# Patient Record
Sex: FEMALE | Race: WHITE | NOT HISPANIC OR LATINO | Employment: FULL TIME | URBAN - METROPOLITAN AREA
[De-identification: names, ages, dates, MRNs, and addresses within clinical notes are randomized per-mention and may not be internally consistent; named-entity substitution may affect disease eponyms.]

---

## 2022-12-09 ENCOUNTER — OFFICE VISIT (OUTPATIENT)
Dept: OBSTETRICS AND GYNECOLOGY | Facility: CLINIC | Age: 27
End: 2022-12-09

## 2022-12-09 VITALS
WEIGHT: 146.8 LBS | HEIGHT: 62 IN | DIASTOLIC BLOOD PRESSURE: 68 MMHG | SYSTOLIC BLOOD PRESSURE: 122 MMHG | BODY MASS INDEX: 27.02 KG/M2

## 2022-12-09 DIAGNOSIS — Z01.419 WOMEN'S ANNUAL ROUTINE GYNECOLOGICAL EXAMINATION: Primary | ICD-10-CM

## 2022-12-09 DIAGNOSIS — R10.2 PELVIC PAIN: ICD-10-CM

## 2022-12-09 PROCEDURE — 99385 PREV VISIT NEW AGE 18-39: CPT | Performed by: NURSE PRACTITIONER

## 2022-12-09 NOTE — PROGRESS NOTES
"     Gynecologic Annual Exam Note        Gynecologic Exam        Subjective     HPI  Lolly Hummel is a 27 y.o.  female who presents for annual well woman exam as a new patient. There were no changes to her medical or surgical history since her last visit.. Patient reports problems with: none. Patient's last menstrual period was 11/10/2022 (exact date).. Her periods are regular every 25-35 days, lasting 5 days. The flow is moderate to heavy, 1st 2 days and then spotting, with mild cramping. Patient reports when she was younger she fell off a horse, since then she has been having on and off left sided pains during ovulation period to the point it is \"unbearable\". Patient states \"it feels like its swelling up\".     Partner Status: Marital Status: single.  She is sexually active. She has not had new partners.. STD testing recommendations have been explained to the patient and she does not desire STD testing. She is desiring to conceive in the near future.    Additional OB/GYN History   Current contraception: contraceptive methods: coitus interuptis   Desires to: do not start contraception  Thromboembolic Disease: none  Age of menarche: 15    History of STD: no    Last Pap : . Results: negative. HPV: not done  Last Completed Pap Smear     This patient has no relevant Health Maintenance data.           History of abnormal Pap smear: no  Gardasil status:completed  Family history of uterine, colon, breast, or ovarian cancer: no  Performs monthly Self-Breast Exam: no  Exercises Regularly:yes  Feelings of Anxiety or Depression: yes - Anxiety   Tobacco Usage?: No     No current outpatient medications on file.     Patient denies the need for medication refills today.    OB History        0    Para   0    Term   0       0    AB   0    Living   0       SAB   0    IAB   0    Ectopic   0    Molar   0    Multiple   0    Live Births   0                Health Maintenance   Topic Date Due   • Annual " "Gynecologic Pelvic and Breast Exam  Never done   • COVID-19 Vaccine (1) Never done   • HPV VACCINES (1 - 2-dose series) Never done   • TDAP/TD VACCINES (1 - Tdap) Never done   • INFLUENZA VACCINE  Never done   • HEPATITIS C SCREENING  Never done   • PAP SMEAR  Never done   • LIPID PANEL  Never done   • Pneumococcal Vaccine 0-64  Aged Out       Past Medical History:   Diagnosis Date   • Anxiety 2012    Have been mediated previously with ativan, not currently medicated   • Hyperlipidemia 2015    Lifestyle related, has improved since.   • Seizures (HCC) 2013    Reaction to penecillin        Past Surgical History:   Procedure Laterality Date   • TONSILLECTOMY      21 years old       The additional following portions of the patient's history were reviewed and updated as appropriate: allergies, current medications, past family history, past medical history, past social history and past surgical history.    Review of Systems   Constitutional: Negative.    HENT: Negative.    Eyes: Negative.    Respiratory: Negative.    Cardiovascular: Negative.    Gastrointestinal: Negative.    Endocrine: Negative.    Genitourinary: Negative.    Musculoskeletal: Negative.    Skin: Negative.    Allergic/Immunologic: Negative.    Neurological: Negative.    Hematological: Negative.    Psychiatric/Behavioral: Negative.          I have reviewed and agree with the HPI, ROS, and historical information as entered above. Alia Arboleda, APRN        Objective   /68 (BP Location: Right arm)   Ht 157.5 cm (62\")   Wt 66.6 kg (146 lb 12.8 oz)   LMP 11/10/2022 (Exact Date)   Breastfeeding No   BMI 26.85 kg/m²     Physical Exam  Vitals and nursing note reviewed. Exam conducted with a chaperone present.   Constitutional:       Appearance: Normal appearance. She is well-developed and normal weight.   HENT:      Head: Normocephalic and atraumatic.   Neck:      Thyroid: No thyroid mass or thyromegaly.   Cardiovascular:      Rate and Rhythm: " Normal rate and regular rhythm.      Heart sounds: No murmur heard.  Pulmonary:      Effort: Pulmonary effort is normal. No retractions.      Breath sounds: Normal breath sounds. No wheezing, rhonchi or rales.   Chest:      Chest wall: No mass or tenderness.   Breasts:     Right: Normal. No mass, nipple discharge, skin change or tenderness.      Left: Normal. No mass, nipple discharge, skin change or tenderness.   Abdominal:      Palpations: Abdomen is soft. Abdomen is not rigid. There is no mass.      Tenderness: There is no abdominal tenderness. There is no guarding.      Hernia: No hernia is present.   Genitourinary:     General: Normal vulva.      Exam position: Lithotomy position.      Labia:         Right: No rash, tenderness or lesion.         Left: No rash, tenderness or lesion.       Vagina: Normal. No vaginal discharge or lesions.      Cervix: No cervical motion tenderness, discharge, lesion or cervical bleeding.      Uterus: Normal. Not enlarged, not fixed and not tender.       Adnexa: Right adnexa normal and left adnexa normal.        Right: No mass or tenderness.          Left: No mass or tenderness.        Rectum: Normal. No external hemorrhoid.   Musculoskeletal:      Cervical back: Normal range of motion. No muscular tenderness.   Neurological:      Mental Status: She is alert and oriented to person, place, and time.   Psychiatric:         Behavior: Behavior normal.            Assessment and Plan    Problem List Items Addressed This Visit    None  Visit Diagnoses     Women's annual routine gynecological examination    -  Primary    Relevant Orders    LIQUID-BASED PAP SMEAR, [facility] ([facility])    Pelvic pain        Relevant Orders    US Non-ob Transvaginal          1. GYN annual well woman exam.   2. Pt has never had pelvic US to FU on pelvic pain on left side. Pt to RTC for this at her convenience. Order placed.  3. Reviewed pap guidelines.   4. Reviewed monthly self breast exams.  Instructed to  call with lumps, pain, or breast discharge.    5. Reviewed exercise as a preventative health measures.   6. Reccommended Flu Vaccine in Fall of each year.  7. RTC in 1 year or PRN with problems        Alia Arboleda, APRN  12/09/2022

## 2022-12-13 LAB — REF LAB TEST METHOD: NORMAL

## 2022-12-19 ENCOUNTER — TELEPHONE (OUTPATIENT)
Dept: OBSTETRICS AND GYNECOLOGY | Facility: CLINIC | Age: 27
End: 2022-12-19

## 2023-01-06 ENCOUNTER — OFFICE VISIT (OUTPATIENT)
Dept: OBSTETRICS AND GYNECOLOGY | Facility: CLINIC | Age: 28
End: 2023-01-06
Payer: COMMERCIAL

## 2023-01-06 VITALS
DIASTOLIC BLOOD PRESSURE: 66 MMHG | BODY MASS INDEX: 27.97 KG/M2 | SYSTOLIC BLOOD PRESSURE: 110 MMHG | WEIGHT: 152 LBS | HEIGHT: 62 IN

## 2023-01-06 DIAGNOSIS — R10.2 PELVIC PAIN: Primary | ICD-10-CM

## 2023-01-06 PROCEDURE — 99213 OFFICE O/P EST LOW 20 MIN: CPT | Performed by: NURSE PRACTITIONER

## 2023-01-06 NOTE — PROGRESS NOTES
Chief Complaint   Patient presents with   • Gynecologic Exam   • Follow-up   • Pelvic Pain       Subjective   HPI  Lolly Hummel is a 27 y.o. female, . Her last LMP was Patient's last menstrual period was 2022.. Who presents for follow up on pelvic pain . From previous visit note: Patient reports when she was younger she fell off a horse, since then she has intermittent left sided pains during ovulation period to the point it is \"unbearable\". Patient states \"it feels like its swelling up\".      At her last visit it was discussed to return for US for pelvic pain on left side. Since then she reports her symptoms/issue has remained unchanged. The patient reports additional symptoms as none.        Additional OB/GYN History     Last Pap : 2022 LSIL  Last Completed Pap Smear          PAP SMEAR (Yearly) Next due on 2022  LIQUID-BASED PAP SMEAR, [facility] ([facility])                Last mammogram: N/A  Last Completed Mammogram     This patient has no relevant Health Maintenance data.          Tobacco Usage?: No   OB History        0    Para   0    Term   0       0    AB   0    Living   0       SAB   0    IAB   0    Ectopic   0    Molar   0    Multiple   0    Live Births   0                No current outpatient medications on file.     Past Medical History:   Diagnosis Date   • Anxiety     Have been mediated previously with ativan, not currently medicated   • Hyperlipidemia     Lifestyle related, has improved since.   • Seizures (HCC) 2013    Reaction to penecillin        Past Surgical History:   Procedure Laterality Date   • TONSILLECTOMY      21 years old       The additional following portions of the patient's history were reviewed and updated as appropriate: allergies, current medications, past family history, past medical history, past social history, past surgical history and problem list.    Review of Systems   Constitutional: Negative.     Respiratory: Negative.    Cardiovascular: Negative.    Gastrointestinal: Negative.    Genitourinary: Positive for pelvic pain ( left sided pelvic pain).   Psychiatric/Behavioral: Negative.        I have reviewed and agree with the HPI, ROS, and historical information as entered above. Alia OSORIO Robles, APRN    Objective   /66   Ht 157.5 cm (62.01\")   Wt 68.9 kg (152 lb)   LMP 12/13/2022   BMI 27.79 kg/m²     Physical Exam  Vitals and nursing note reviewed.   Constitutional:       Appearance: Normal appearance. She is well-developed and normal weight.   HENT:      Head: Normocephalic and atraumatic.   Cardiovascular:      Rate and Rhythm: Normal rate and regular rhythm.   Pulmonary:      Effort: Pulmonary effort is normal.      Breath sounds: Normal breath sounds.   Abdominal:      Palpations: Abdomen is soft. Abdomen is not rigid.   Musculoskeletal:      Cervical back: Normal range of motion.   Neurological:      Mental Status: She is alert and oriented to person, place, and time.   Psychiatric:         Behavior: Behavior normal.         Assessment & Plan     Assessment     Problem List Items Addressed This Visit    None  Visit Diagnoses     Pelvic pain    -  Primary          1.   US normal: shows CL cyst on right ovary. No cyst left ovary. No fibroids or polyps. EMC 16.2. No uterine malformations. Anteverted uterus.     Plan     1. All questions answered. US results reviewed. No concerning findings.  2. FU with SEJAL 1/13/23 for previously scheduled colposcopy      Maruadrian OSORIO Robles, APRN  01/06/2023

## 2023-01-13 ENCOUNTER — OFFICE VISIT (OUTPATIENT)
Dept: OBSTETRICS AND GYNECOLOGY | Facility: CLINIC | Age: 28
End: 2023-01-13
Payer: COMMERCIAL

## 2023-01-13 VITALS
WEIGHT: 148.2 LBS | SYSTOLIC BLOOD PRESSURE: 124 MMHG | DIASTOLIC BLOOD PRESSURE: 76 MMHG | HEIGHT: 62 IN | BODY MASS INDEX: 27.27 KG/M2

## 2023-01-13 DIAGNOSIS — R87.612 LGSIL ON PAP SMEAR OF CERVIX: ICD-10-CM

## 2023-01-13 DIAGNOSIS — Z87.42 HISTORY OF ABNORMAL CERVICAL PAP SMEAR: Primary | ICD-10-CM

## 2023-01-13 LAB
B-HCG UR QL: NEGATIVE
EXPIRATION DATE: NORMAL
INTERNAL NEGATIVE CONTROL: NORMAL
INTERNAL POSITIVE CONTROL: NORMAL
Lab: NORMAL

## 2023-01-13 PROCEDURE — 57454 BX/CURETT OF CERVIX W/SCOPE: CPT | Performed by: OBSTETRICS & GYNECOLOGY

## 2023-01-13 PROCEDURE — 81025 URINE PREGNANCY TEST: CPT | Performed by: OBSTETRICS & GYNECOLOGY

## 2023-01-13 NOTE — PROGRESS NOTES
Colposcopy Procedure Note        Colposcopy    Date/Time: 2023 1:44 PM  Performed by: Marlon Young MD  Authorized by: Marlon Young MD   Preparation: Patient was prepped and draped in the usual sterile fashion.  Patient tolerance: patient tolerated the procedure well with no immediate complications          Indications: Lolly Hummel is a 27 y.o. female, , whose Patient's last menstrual period was 2023 (approximate)..  She presents for follow up for evaluation of an abnormal PAP smear that showed low-grade squamous intraepithelial neoplasia (LGSIL - encompassing HPV,mild dysplasia,JOHAN I). Prior cervical treatment includes: no treatment. She understands the need for the procedure and is aware of the complications, including post-colposcopic vaginal bleeding, vaginal leukorrhea or cervicitis.  She is aware she may experience discomfort.  After being presented with the risk, benefits, and alternatives the patient wished to proceed.      Urine pregnancy test done in the office today was Negative.      The patient has had Gardasil.  She is not a smoker.      Procedure Details   The risks and benefits of the procedure and Verbal informed consent obtained.    She was positioned in the dorsal lithotomy position and a speculum was inserted into the vagina and excellent visualization of cervix achieved, cervix swabbed x 3 with acetic acid solution. The transformation zone was completely visualized.  A cervical biopsy was obtained at 7 o'clock.  An endocervical curettage was performed.  This colposcopy was satisfactory.     Findings: Light white epithelium around os posteriorly.    The procedure was notable for: Small cervix.  Physical Exam  Genitourinary:              Specimens: Biopsy at 7 o'clock position and ECC  Specimens labelled and sent to Pathology.    Complications: none.    The patient tolerated the procedure very well and with mild discomfort and bleeding.    Assessment and  Plan    Problem List Items Addressed This Visit     LGSIL on Pap smear of cervix    Overview     12/9/2022 Pap smear with LGSIL; (BB)    1/13/2023 colposcopy        Other Visit Diagnoses     History of abnormal cervical Pap smear    -  Primary    Relevant Orders    TISSUE EXAM, [facility] ([facility])    POC Pregnancy, Urine (Completed)          1. Will base further treatment on Pathology findings.  2. Treatment options discussed with patient.  3. Post biopsy instructions given to patient.  4. Repeat PAP in 6 months.     Marlon Young MD  01/13/2023

## 2023-01-17 PROBLEM — N87.0 MILD DYSPLASIA OF CERVIX (CIN I): Status: ACTIVE | Noted: 2023-01-17

## 2023-01-17 LAB — REF LAB TEST METHOD: NORMAL

## 2024-01-16 ENCOUNTER — OFFICE VISIT (OUTPATIENT)
Dept: OBSTETRICS AND GYNECOLOGY | Facility: CLINIC | Age: 29
End: 2024-01-16
Payer: COMMERCIAL

## 2024-01-16 VITALS — WEIGHT: 155 LBS | BODY MASS INDEX: 28.35 KG/M2 | SYSTOLIC BLOOD PRESSURE: 120 MMHG | DIASTOLIC BLOOD PRESSURE: 62 MMHG

## 2024-01-16 DIAGNOSIS — Z87.42 HX OF ABNORMAL CERVICAL PAP SMEAR: Primary | ICD-10-CM

## 2024-01-16 DIAGNOSIS — Z87.42 HISTORY OF OVARIAN CYST: ICD-10-CM

## 2024-01-16 DIAGNOSIS — R87.612 LGSIL ON PAP SMEAR OF CERVIX: ICD-10-CM

## 2024-01-16 DIAGNOSIS — N87.0 MILD DYSPLASIA OF CERVIX (CIN I): ICD-10-CM

## 2024-01-16 NOTE — PROGRESS NOTES
Gynecologic Annual Exam Note        Gynecologic Exam        Subjective     HPI  Lolly Hummel is a 28 y.o.  female who presents for annual well woman exam as a established patient. There were no changes to her medical or surgical history since her last visit.. Patient reports problems with:  pelvic pain on left side from ovarian cysts. Patient also reported that she fell off of a horse when she was 11 years old. She fell on her left side and bulged two discs and tilted her uterus. Patient's doctor reported that her uterus was tilted d/t the fall. Patient has not had an official ultrasound to view uterus or ovaries, but reports using a vet's ultrasound to identify her own ovarian cysts . Patient's last menstrual period was 2024 (exact date).. Her periods occur every 25-35 days , lasting 5 days. The flow is moderate.. She reports dysmenorrhea is moderate, occurring premenstrually and first 1-2 days of flow.     Partner Status: Marital Status: single.  She is sexually active. She has not had new partners.. STD testing recommendations have been explained to the patient and she does not desire STD testing.        Additional OB/GYN History   Current contraception: contraceptive methods: None  Desires to: do not start contraception  Thromboembolic Disease: none  Age of menarche: 16    History of STD: no    Last Pap : 2023. Results: negative. HPV: negative.   Last Completed Pap Smear            Ordered - PAP SMEAR (Yearly) Ordered on 2023  LIQUID-BASED PAP SMEAR WITH HPV GENOTYPING REGARDLESS OF INTERPRETATION ([facility])    2022  LIQUID-BASED PAP SMEAR, [facility] ([facility])                     History of abnormal Pap smear: yes - 2022 and colpo in 2023  Gardasil status:completed  Family history of uterine, colon, breast, or ovarian cancer: no  Performs monthly Self-Breast Exam: no  Exercises Regularly:yes  Feelings of Anxiety or Depression: situational  anxiety  Tobacco Usage?: No     No current outpatient medications on file.     Requesting just the ativan rx.     OB History          0    Para   0    Term   0       0    AB   0    Living   0         SAB   0    IAB   0    Ectopic   0    Molar   0    Multiple   0    Live Births   0                Health Maintenance   Topic Date Due    BMI FOLLOWUP  Never done    COVID-19 Vaccine (1) Never done    HEPATITIS C SCREENING  Never done    ANNUAL PHYSICAL  Never done    LIPID PANEL  Never done    INFLUENZA VACCINE  Never done    Annual Gynecologic Pelvic and Breast Exam  12/10/2023    PAP SMEAR  2024    TDAP/TD VACCINES (2 - Tdap) 2033    Pneumococcal Vaccine 0-64  Aged Out       Past Medical History:   Diagnosis Date    Anxiety     Have been mediated previously with ativan, not currently medicated    Functional ovarian cysts     Hyperlipidemia     Lifestyle related, has improved since.    Seizures 2013    Reaction to penecillin        Past Surgical History:   Procedure Laterality Date    TONSILLECTOMY      21 years old    WISDOM TOOTH EXTRACTION         The additional following portions of the patient's history were reviewed and updated as appropriate: allergies, current medications, past family history, past medical history, past social history, past surgical history, and problem list.    Review of Systems   Constitutional: Negative.    HENT: Negative.     Eyes: Negative.    Respiratory: Negative.     Cardiovascular: Negative.    Gastrointestinal: Negative.    Endocrine: Negative.    Genitourinary:  Positive for pelvic pain.   Musculoskeletal: Negative.    Skin: Negative.    Allergic/Immunologic: Negative.    Neurological: Negative.    Hematological: Negative.    Psychiatric/Behavioral: Negative.     All other systems reviewed and are negative.        I have reviewed and agree with the HPI, ROS, and historical information as entered above. Alia Arboleda, APRN          Objective    /62   Wt 70.3 kg (155 lb)   LMP 01/05/2024 (Exact Date)   BMI 28.35 kg/m²     Physical Exam  Vitals and nursing note reviewed. Exam conducted with a chaperone present.   Constitutional:       Appearance: Normal appearance. She is well-developed.   HENT:      Head: Normocephalic and atraumatic.   Neck:      Thyroid: No thyroid mass or thyromegaly.   Cardiovascular:      Rate and Rhythm: Normal rate.      Heart sounds: No murmur heard.  Pulmonary:      Effort: Pulmonary effort is normal. No retractions.      Breath sounds: No wheezing, rhonchi or rales.   Chest:      Chest wall: No mass or tenderness.   Breasts:     Right: Normal. No mass, nipple discharge, skin change or tenderness.      Left: Normal. No mass, nipple discharge, skin change or tenderness.   Abdominal:      Palpations: Abdomen is soft. Abdomen is not rigid. There is no mass.      Tenderness: There is no abdominal tenderness. There is no guarding.      Hernia: No hernia is present.   Genitourinary:     General: Normal vulva.      Exam position: Lithotomy position.      Labia:         Right: No rash, tenderness or lesion.         Left: No rash, tenderness or lesion.       Vagina: Normal. No vaginal discharge or lesions.      Cervix: No cervical motion tenderness, discharge, lesion or cervical bleeding.      Uterus: Normal. Not enlarged, not fixed and not tender.       Adnexa: Right adnexa normal and left adnexa normal.        Right: No mass or tenderness.          Left: No mass or tenderness.        Rectum: Normal. No external hemorrhoid.   Musculoskeletal:      Cervical back: Normal range of motion. No muscular tenderness.   Neurological:      Mental Status: She is alert and oriented to person, place, and time.   Psychiatric:         Behavior: Behavior normal.            Assessment and Plan    Problem List Items Addressed This Visit       LGSIL on Pap smear of cervix    Overview     12/9/2022 Pap smear with LGSIL; (BB)    1/13/2023 colposcopy  with biopsy at 7 and ECC; Mild dysplasia. ECC Negative.         Mild dysplasia of cervix (JOHAN I)    Overview     1/13/2023 colposcopy with biopsy at 7 and ECC; Mild dysplasia. ECC Negative.  Follow with 6 month pap smears.         History of ovarian cyst    Relevant Orders    US Non-ob Transvaginal     Other Visit Diagnoses       Hx of abnormal cervical Pap smear    -  Primary    Relevant Orders    LIQUID-BASED PAP SMEAR WITH HPV GENOTYPING REGARDLESS OF INTERPRETATION ([facility])            GYN annual well woman exam.   Reviewed pap guidelines. Pap repeated today. She has had one negative pap (July 2023) since colpo showed dysplasia.   Return for gyn US for pelvic pain,history ovarian cysts.  Pt does not desire contraception.  Encouraged use of condoms for STD prevention.  Reviewed monthly self breast exams.  Instructed to call with lumps, pain, or breast discharge.    Reviewed exercise as a preventative health measures.   Reccommended Flu Vaccine in Fall of each year.  RTC in 1 year or PRN with problems  FU in 6 months for repeat pap      Alia Arboleda, APRN  01/16/2024

## 2024-01-19 LAB — REF LAB TEST METHOD: NORMAL

## 2024-03-28 ENCOUNTER — OFFICE VISIT (OUTPATIENT)
Dept: OBSTETRICS AND GYNECOLOGY | Facility: CLINIC | Age: 29
End: 2024-03-28
Payer: COMMERCIAL

## 2024-03-28 VITALS
DIASTOLIC BLOOD PRESSURE: 64 MMHG | WEIGHT: 151 LBS | HEIGHT: 62 IN | BODY MASS INDEX: 27.79 KG/M2 | SYSTOLIC BLOOD PRESSURE: 108 MMHG

## 2024-03-28 DIAGNOSIS — R10.2 PELVIC PAIN: Primary | ICD-10-CM

## 2024-03-28 PROCEDURE — 99213 OFFICE O/P EST LOW 20 MIN: CPT | Performed by: NURSE PRACTITIONER

## 2024-03-28 NOTE — PROGRESS NOTES
Chief Complaint   Patient presents with    Follow-up    Pelvic Pain         Subjective   HPI  Lolly Hummel is a 28 y.o. female, , who presents for follow up evaluation of ovarian cyst.      Her last LMP was Patient's last menstrual period was 2024 (exact date).  She was last seen 2 month(s) ago. At that time the plan was expectant management for management of the cyst. Since her last visit she admits pelvic pain. The patient reports additional symptoms as irregular periods and ovulation. She is not trying to conceive.      Did the patient have u/s today? Yes US shows right ovary with trace fluid adjacent to ovary. Normal left ovary. Mild free fluid. Endometrial thickness 6.4 mm.     Additional OB/GYN History   Last Pap : 24-Neg, HPV NEG  Last Completed Pap Smear            PAP SMEAR (Yearly) Next due on 2024  LIQUID-BASED PAP SMEAR WITH HPV GENOTYPING REGARDLESS OF INTERPRETATION ([facility])    2023  LIQUID-BASED PAP SMEAR WITH HPV GENOTYPING REGARDLESS OF INTERPRETATION ([facility])    2022  LIQUID-BASED PAP SMEAR, [facility] ([facility])                  History of abnormal Pap smear: yes - 22 LSIL-Colpo- mild dysplasia  Tobacco Usage?: No    No current outpatient medications on file.     Past Medical History:   Diagnosis Date    Anxiety     Have been mediated previously with ativan, not currently medicated    Functional ovarian cysts     Hyperlipidemia     Lifestyle related, has improved since.    Seizures 2013    Reaction to penecillin        Past Surgical History:   Procedure Laterality Date    TONSILLECTOMY      21 years old    WISDOM TOOTH EXTRACTION         The additional following portions of the patient's history were reviewed and updated as appropriate: allergies, current medications, past family history, past medical history, past social history, past surgical history, and problem list.    Review of Systems   Constitutional:  "Negative.    HENT: Negative.     Eyes: Negative.    Respiratory: Negative.     Cardiovascular: Negative.    Gastrointestinal: Negative.    Endocrine: Negative.    Genitourinary:  Positive for pelvic pain.   Musculoskeletal: Negative.    Skin: Negative.    Allergic/Immunologic: Negative.    Neurological: Negative.    Hematological: Negative.    Psychiatric/Behavioral: Negative.       All other systems reviewed and are negative.     I have reviewed and agree with the HPI, ROS, and historical information as entered above. Alia OSORIO Robles, APRN      /64   Ht 157.5 cm (62\")   Wt 68.5 kg (151 lb)   LMP 03/18/2024 (Exact Date)   BMI 27.62 kg/m²     Physical Exam  Vitals and nursing note reviewed.   Constitutional:       Appearance: Normal appearance. She is well-developed.   HENT:      Head: Normocephalic and atraumatic.   Cardiovascular:      Rate and Rhythm: Normal rate and regular rhythm.   Pulmonary:      Effort: Pulmonary effort is normal.      Breath sounds: Normal breath sounds.   Abdominal:      Palpations: Abdomen is soft. Abdomen is not rigid.   Musculoskeletal:      Cervical back: Normal range of motion.   Neurological:      Mental Status: She is alert and oriented to person, place, and time.   Psychiatric:         Behavior: Behavior normal.         Assessment & Plan     Assessment and Plan    Problem List Items Addressed This Visit    None  Visit Diagnoses       Pelvic pain    -  Primary            US results reviewed and appear normal.   Lolly has had chronic pelvic pain due to injury from falling from a horse when she was young. She has had multiple ovarian cysts but these have usually been on the left ovary.  Discussed treatment measures for irregular cycles and ovulation. She does not desire intervention at this time.  Continue to monitor and FU for no improvement or worsening of symptoms.      Maruadrian OSORIO oRbles, APRN  03/28/2024  "

## 2024-09-04 ENCOUNTER — APPOINTMENT (OUTPATIENT)
Dept: CT IMAGING | Facility: HOSPITAL | Age: 29
End: 2024-09-04
Payer: OTHER MISCELLANEOUS

## 2024-09-04 ENCOUNTER — HOSPITAL ENCOUNTER (EMERGENCY)
Facility: HOSPITAL | Age: 29
Discharge: HOME | End: 2024-09-04
Attending: EMERGENCY MEDICINE
Payer: OTHER MISCELLANEOUS

## 2024-09-04 VITALS
SYSTOLIC BLOOD PRESSURE: 114 MMHG | RESPIRATION RATE: 18 BRPM | TEMPERATURE: 98.1 F | OXYGEN SATURATION: 96 % | HEIGHT: 62 IN | HEART RATE: 70 BPM | DIASTOLIC BLOOD PRESSURE: 68 MMHG | BODY MASS INDEX: 27.6 KG/M2 | WEIGHT: 150 LBS

## 2024-09-04 DIAGNOSIS — F07.81 POSTCONCUSSIVE SYNDROME: Primary | ICD-10-CM

## 2024-09-04 LAB
B-HCG UR QL: NEGATIVE
EXPIRATION DATE: NORMAL
INTERNAL NEGATIVE CONTROL: NEGATIVE
INTERNAL POSITIVE CONTROL: POSITIVE
Lab: NORMAL

## 2024-09-04 PROCEDURE — 70450 CT HEAD/BRAIN W/O DYE: CPT

## 2024-09-04 PROCEDURE — 81025 URINE PREGNANCY TEST: CPT | Performed by: PHYSICIAN ASSISTANT

## 2024-09-04 PROCEDURE — 99284 EMERGENCY DEPT VISIT MOD MDM: CPT
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
## 2024-10-07 ENCOUNTER — HOSPITAL ENCOUNTER (EMERGENCY)
Facility: HOSPITAL | Age: 29
Discharge: HOME | End: 2024-10-07
Attending: EMERGENCY MEDICINE | Admitting: EMERGENCY MEDICINE
Payer: COMMERCIAL

## 2024-10-07 VITALS
WEIGHT: 150 LBS | HEIGHT: 62 IN | BODY MASS INDEX: 27.6 KG/M2 | OXYGEN SATURATION: 100 % | HEART RATE: 91 BPM | RESPIRATION RATE: 16 BRPM | TEMPERATURE: 98.5 F | DIASTOLIC BLOOD PRESSURE: 92 MMHG | SYSTOLIC BLOOD PRESSURE: 135 MMHG

## 2024-10-07 DIAGNOSIS — H92.02 ACUTE OTALGIA, LEFT: ICD-10-CM

## 2024-10-07 DIAGNOSIS — J30.2 SEASONAL ALLERGIES: ICD-10-CM

## 2024-10-07 DIAGNOSIS — Z87.09 HISTORY OF URI (UPPER RESPIRATORY INFECTION): ICD-10-CM

## 2024-10-07 DIAGNOSIS — H65.192 OTHER NON-RECURRENT ACUTE NONSUPPURATIVE OTITIS MEDIA OF LEFT EAR: Primary | ICD-10-CM

## 2024-10-07 PROCEDURE — 99283 EMERGENCY DEPT VISIT LOW MDM: CPT

## 2024-10-07 RX ORDER — CEFDINIR 300 MG/1
300 CAPSULE ORAL 2 TIMES DAILY
Qty: 14 CAPSULE | Refills: 0 | Status: SHIPPED | OUTPATIENT
Start: 2024-10-07 | End: 2024-10-14

## 2024-10-07 RX ORDER — CEFDINIR 300 MG/1
300 CAPSULE ORAL ONCE
Status: COMPLETED | OUTPATIENT
Start: 2024-10-07 | End: 2024-10-07

## 2024-10-07 RX ORDER — FLUCONAZOLE 150 MG/1
150 TABLET ORAL ONCE
Qty: 1 TABLET | Refills: 0 | Status: SHIPPED | OUTPATIENT
Start: 2024-10-07 | End: 2024-10-08

## 2024-10-07 RX ORDER — CETIRIZINE HYDROCHLORIDE 10 MG/1
10 TABLET ORAL DAILY
Qty: 30 TABLET | Refills: 0 | Status: SHIPPED | OUTPATIENT
Start: 2024-10-07

## 2024-10-07 RX ADMIN — CEFDINIR 300 MG: 300 CAPSULE ORAL at 03:32

## 2024-10-07 NOTE — Clinical Note
[facility] EMERGENCY DEPARTMENT  [address]  [facility] [address]  Phone: [phone]    Lolly Hummel was seen and treated in our emergency department on 10/7/2024.  She may return to work on 10/08/2024.         Thank you for choosing [facility].    Edyta Carrasquillo, ROBBIE

## 2024-10-07 NOTE — ED PROVIDER NOTES
"Subjective   History of Present Illness  This is a 29-year-old female that presents to the ER with sudden onset of left ear pain that awakened her from sleep at around 1 AM this morning.  Patient has had ongoing, recurrent URI symptoms with history of seasonal allergies.  Patient says that she has had rhinorrhea, nasal congestion, and mild cough for the last 3 to 4 weeks.  She says that symptoms initially only lasted a week and then she improved and then they started again approximately 1 week ago.  She denies any fever, chills, headache, body aches, or chest congestion.  She has been taking over-the-counter DayQuil for symptom relief.  Patient awakened with intense ear pain described as pressure and she also says it \"feels like she is underwater\".  She took Tylenol prior to arrival which is easing the symptoms some.  She denies any recent trip in an airplane.  She denies any tenderness to the left ear or drainage.  She denies recurrent otitis media and denies previous history of tubes.  Past medical history is significant for anxiety, hyperlipidemia, ovarian cyst.    History provided by:  Patient  Earache  Location:  Left  Quality:  Aching (Pressure. \"Feels like under water\")  Onset quality:  Sudden  Duration:  1 hour  Context: recent URI    Context comment:  History of seasonal allergies and recent URI sxs 3-4 weeks ago. No fever. Pt awakened at 0100 with left ear ache.  Relieved by:  Nothing  Worsened by:  Nothing  Ineffective treatments: Pt took Tylenol at 0115.  Associated symptoms: congestion, cough, rhinorrhea and sore throat    Associated symptoms: no abdominal pain, no diarrhea, no ear discharge, no fever, no headaches, no hearing loss, no neck pain, no rash, no tinnitus and no vomiting    Risk factors: no recent travel, no chronic ear infection and no prior ear surgery    Risk factors comment:  Pt denies any h/o recurrent OM. No previous history of ear tubes.      Review of Systems   Constitutional: " Negative.  Negative for activity change, appetite change, chills, diaphoresis, fatigue and fever.   HENT:  Positive for congestion, ear pain (Sudden onset of left ear pain that awakened patient from sleep around 1 AM this morning), rhinorrhea, sneezing and sore throat. Negative for ear discharge, hearing loss, sinus pressure, sinus pain and tinnitus.    Respiratory:  Positive for cough. Negative for shortness of breath and wheezing.    Cardiovascular: Negative.    Gastrointestinal: Negative.  Negative for abdominal pain, diarrhea and vomiting.   Genitourinary: Negative.    Musculoskeletal: Negative.  Negative for neck pain.   Skin:  Negative for rash.   Neurological: Negative.  Negative for dizziness and headaches.   All other systems reviewed and are negative.      Past Medical History:   Diagnosis Date    Anxiety 2012    Have been mediated previously with ativan, not currently medicated    Functional ovarian cysts     Hyperlipidemia 2015    Lifestyle related, has improved since.    Seizures 2013    Reaction to penecillin       Allergies   Allergen Reactions    Penicillins Seizure    Shellfish-Derived Products Other (See Comments)     Airway narrows        Past Surgical History:   Procedure Laterality Date    TONSILLECTOMY      21 years old    WISDOM TOOTH EXTRACTION         Family History   Problem Relation Age of Onset    Cervical cancer Mother 30    Miscarriages / Stillbirths Mother         every 2nd pregnancy    Hypertension Paternal Grandfather     Breast cancer Neg Hx     Ovarian cancer Neg Hx     Uterine cancer Neg Hx     Colon cancer Neg Hx     Diabetes Neg Hx     Stroke Neg Hx     Osteoporosis Neg Hx        Social History     Socioeconomic History    Marital status: Single   Tobacco Use    Smoking status: Never    Smokeless tobacco: Never   Vaping Use    Vaping status: Never Used   Substance and Sexual Activity    Alcohol use: Yes     Alcohol/week: 1.0 standard drink of alcohol     Types: 1 Cans of beer  per week     Comment: Occasional drink, unless a special event    Drug use: Never    Sexual activity: Yes     Partners: Male     Birth control/protection: Coitus interruptus           Objective   Physical Exam  Vitals and nursing note reviewed.   Constitutional:       General: She is not in acute distress.     Appearance: Normal appearance. She is not ill-appearing, toxic-appearing or diaphoretic.      Comments: No acute sign of pain or distress.  Nontoxic.   HENT:      Head: Normocephalic and atraumatic.      Right Ear: Tympanic membrane normal. No drainage, swelling or tenderness. Tympanic membrane is not perforated, erythematous, retracted or bulging.      Left Ear: No drainage, swelling or tenderness. Tympanic membrane is erythematous and bulging. Tympanic membrane is not perforated or retracted.      Ears:      Comments: Right tragus nontender to palpation.  Right external ear and right TM are clear.  Left tragus is nontender to palpation and left external canal has no erythema, drainage, or swelling.  Left TM is bulging with erythema and air-fluid levels.     Nose: Congestion and rhinorrhea present.      Right Sinus: No maxillary sinus tenderness or frontal sinus tenderness.      Left Sinus: No maxillary sinus tenderness or frontal sinus tenderness.      Comments: Audible nasal congestion with rhinorrhea.  No frontal or maxillary sinus tenderness     Mouth/Throat:      Mouth: Mucous membranes are moist.      Pharynx: Oropharynx is clear. No pharyngeal swelling, oropharyngeal exudate, posterior oropharyngeal erythema or uvula swelling.      Comments: Oral mucous membranes are moist.  Posterior pharynx is not erythematous  Eyes:      Extraocular Movements: Extraocular movements intact.      Conjunctiva/sclera: Conjunctivae normal.      Pupils: Pupils are equal, round, and reactive to light.   Cardiovascular:      Rate and Rhythm: Normal rate and regular rhythm. No extrasystoles are present.     Pulses: Normal  pulses.      Heart sounds: Normal heart sounds.   Pulmonary:      Effort: Pulmonary effort is normal. No tachypnea or accessory muscle usage.      Breath sounds: Normal breath sounds. No decreased breath sounds, wheezing or rhonchi.      Comments: Regular respiratory effort.  Good air exchange bilateral lung fields.  Abdominal:      General: Bowel sounds are normal.      Palpations: Abdomen is soft.   Musculoskeletal:         General: Normal range of motion.      Cervical back: Normal range of motion and neck supple.      Right lower leg: No edema.      Left lower leg: No edema.   Skin:     General: Skin is warm and dry.   Neurological:      General: No focal deficit present.      Mental Status: She is alert and oriented to person, place, and time.      Cranial Nerves: Cranial nerves 2-12 are intact.      Sensory: Sensation is intact.      Motor: Motor function is intact.      Coordination: Coordination is intact.         Procedures           ED Course  ED Course as of 10/07/24 0353   Mon Oct 07, 2024   0351 Patient is afebrile and all other vital signs are stable.  She has had recurrent URI symptoms x 3 to 4 weeks with underlying seasonal allergies.  There is evidence of left otitis media on today's exam and we will prescribe Omnicef 300 mg by mouth twice daily x 7 days.  Patient request Diflucan with antibiotic course.  We also will prescribe Zyrtec 10 mg by mouth daily dispense 30 no refills.  Also recommended over-the-counter Aleve-D every 12 hours to help with sinus pressure and nasal congestion.  Recommend patient to establish care with PCP in the [place] area for close recheck.  We gave phone number for patient connection.  Return to the ER if worsening symptoms. [FC]      ED Course User Index  [FC] Edyta Carrasquillo PA-C                                   No results found for this or any previous visit (from the past 24 hour(s)).  Note: In addition to lab results from this visit, the labs listed above may  "include labs taken at another facility or during a different encounter within the last 24 hours. Please correlate lab times with ED admission and discharge times for further clarification of the services performed during this visit.    No orders to display     Vitals:    10/07/24 0218   BP: 135/92   BP Location: Left arm   Patient Position: Sitting   Pulse: 91   Resp: 16   Temp: 98.5 °F (36.9 °C)   TempSrc: Oral   SpO2: 100%   Weight: 68 kg (150 lb)   Height: 157.5 cm (62\")     Medications   cefdinir (OMNICEF) capsule 300 mg (300 mg Oral Given 10/7/24 0332)     ECG/EMG Results (last 24 hours)       ** No results found for the last 24 hours. **          No orders to display                 Medical Decision Making  Problems Addressed:  Acute otalgia, left: complicated acute illness or injury  History of URI (upper respiratory infection): complicated acute illness or injury  Other non-recurrent acute nonsuppurative otitis media of left ear: complicated acute illness or injury  Seasonal allergies: complicated acute illness or injury    Risk  OTC drugs.  Prescription drug management.        Final diagnoses:   Other non-recurrent acute nonsuppurative otitis media of left ear   History of URI (upper respiratory infection)   Seasonal allergies   Acute otalgia, left       ED Disposition  ED Disposition       ED Disposition   Discharge    Condition   Stable    Comment   --               PATIENT CONNECTION - [facility] [address]  [phone]  Call today  Call today to find out accepting physicians in the [place] area for close recheck    [facility] EMERGENCY DEPARTMENT  [address] [facility] [id number] [phone]    If symptoms worsen         Medication List        New Prescriptions      cefdinir 300 MG capsule  Commonly known as: OMNICEF  Take 1 capsule by mouth 2 (Two) Times a Day for 7 days.     cetirizine 10 MG tablet  Commonly known as: zyrTEC  Take 1 tablet by mouth " Daily.     fluconazole 150 MG tablet  Commonly known as: DIFLUCAN  Take 1 tablet by mouth 1 (One) Time for 1 dose.            Stop      diclofenac 50 MG EC tablet  Commonly known as: VOLTAREN               Where to Get Your Medications        These medications were sent to [facility] - [place] [address]      Hours: Monday to Friday 7 AM to 5:30 PM, Saturday & Sunday 8 AM to 4:30 PM Phone: [phone]   cefdinir 300 MG capsule  cetirizine 10 MG tablet  fluconazole 150 MG tablet            Edyta Carrasquillo PA-C  10/07/24 0353

## 2024-10-07 NOTE — Clinical Note
[facility] EMERGENCY DEPARTMENT  [address]  [facility] [address]  Phone: [phone]    Lolly Hummel was seen and treated in our emergency department on 10/7/2024.  She may return to work on 10/08/2024.         Thank you for choosing [facility].    Edyta Carrasquillo, ROBBIE

## 2024-10-07 NOTE — Clinical Note
[facility] EMERGENCY DEPARTMENT  [address]  [facility] [address]  Phone: [phone]    Lolly Hummel was seen and treated in our emergency department on 10/7/2024.  She may return to work on 10/08/2024.         Thank you for choosing [facility].    Edyta Carrasquillo, ROBBIE

## 2024-10-07 NOTE — DISCHARGE INSTRUCTIONS
ER assessment revealed acute left otitis media with recent viral upper respiratory infection as well as seasonal allergies.  Rx for Omnicef 300 mg by mouth twice daily x 7 days and we also will prescribe Diflucan x 1 to prevent any yeast infection.  Rx for Zyrtec 10 mg by mouth daily dispense 30 to help dry up secretions from increased nasal drainage.  We also recommend over-the-counter Aleve-D every 12 hours to help with sinus headache and congestion.  Encouraged fluids and rest.  Recommend close PCP follow-up for recheck and we gave phone number for patient connection so patient can call the phone number above and find out accepting physicians in the [place] area for recheck.

## 2024-11-27 ENCOUNTER — INITIAL PRENATAL (OUTPATIENT)
Dept: OBSTETRICS AND GYNECOLOGY | Facility: CLINIC | Age: 29
End: 2024-11-27
Payer: COMMERCIAL

## 2024-11-27 VITALS — SYSTOLIC BLOOD PRESSURE: 112 MMHG | BODY MASS INDEX: 27.84 KG/M2 | WEIGHT: 152.2 LBS | DIASTOLIC BLOOD PRESSURE: 60 MMHG

## 2024-11-27 DIAGNOSIS — Z34.90 PREGNANCY, UNSPECIFIED GESTATIONAL AGE: ICD-10-CM

## 2024-11-27 DIAGNOSIS — N87.0 MILD DYSPLASIA OF CERVIX (CIN I): ICD-10-CM

## 2024-11-27 DIAGNOSIS — R87.612 LGSIL ON PAP SMEAR OF CERVIX: Primary | ICD-10-CM

## 2024-11-27 DIAGNOSIS — Z34.91 PRENATAL CARE, FIRST TRIMESTER: ICD-10-CM

## 2024-11-27 DIAGNOSIS — Z3A.01 7 WEEKS GESTATION OF PREGNANCY: ICD-10-CM

## 2024-11-27 PROCEDURE — 0501F PRENATAL FLOW SHEET: CPT | Performed by: OBSTETRICS & GYNECOLOGY

## 2024-11-27 RX ORDER — PRENATAL VIT NO.126/IRON/FOLIC 28MG-0.8MG
TABLET ORAL DAILY
COMMUNITY

## 2024-11-27 RX ORDER — PRENATAL VIT,CAL 76/IRON/FOLIC 29 MG-1 MG
1 TABLET ORAL DAILY
Qty: 30 TABLET | Refills: 11 | Status: SHIPPED | OUTPATIENT
Start: 2024-11-27 | End: 2025-11-27
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
## 2024-11-28 LAB
ABO GROUP BLD: ABNORMAL
APPEARANCE UR: CLEAR
BACTERIA #/AREA URNS HPF: NORMAL /[HPF]
BASOPHILS # BLD AUTO: 0 X10E3/UL (ref 0–0.2)
BASOPHILS NFR BLD AUTO: 0 %
BILIRUB UR QL STRIP: NEGATIVE
BLD GP AB SCN SERPL QL: NEGATIVE
CASTS URNS QL MICRO: NORMAL /LPF
COLOR UR: YELLOW
EOSINOPHIL # BLD AUTO: 0 X10E3/UL (ref 0–0.4)
EOSINOPHIL NFR BLD AUTO: 0 %
EPI CELLS #/AREA URNS HPF: NORMAL /HPF (ref 0–10)
ERYTHROCYTE [DISTWIDTH] IN BLOOD BY AUTOMATED COUNT: 12.9 % (ref 11.7–15.4)
GLUCOSE UR QL STRIP: NEGATIVE
HBV SURFACE AG SERPL QL IA: NEGATIVE
HCT VFR BLD AUTO: 38.4 % (ref 34–46.6)
HCV IGG SERPL QL IA: NON REACTIVE
HGB BLD-MCNC: 12.9 G/DL (ref 11.1–15.9)
HGB UR QL STRIP: NEGATIVE
HIV 1+2 AB+HIV1 P24 AG SERPL QL IA: NON REACTIVE
IMM GRANULOCYTES # BLD AUTO: 0 X10E3/UL (ref 0–0.1)
IMM GRANULOCYTES NFR BLD AUTO: 0 %
KETONES UR QL STRIP: NEGATIVE
LEUKOCYTE ESTERASE UR QL STRIP: ABNORMAL
LYMPHOCYTES # BLD AUTO: 3.2 X10E3/UL (ref 0.7–3.1)
LYMPHOCYTES NFR BLD AUTO: 28 %
MCH RBC QN AUTO: 29.2 PG (ref 26.6–33)
MCHC RBC AUTO-ENTMCNC: 33.6 G/DL (ref 31.5–35.7)
MCV RBC AUTO: 87 FL (ref 79–97)
MICRO URNS: ABNORMAL
MONOCYTES # BLD AUTO: 0.8 X10E3/UL (ref 0.1–0.9)
MONOCYTES NFR BLD AUTO: 7 %
NEUTROPHILS # BLD AUTO: 7.2 X10E3/UL (ref 1.4–7)
NEUTROPHILS NFR BLD AUTO: 65 %
NITRITE UR QL STRIP: NEGATIVE
PH UR STRIP: 5.5 [PH] (ref 5–7.5)
PLATELET # BLD AUTO: 253 X10E3/UL (ref 150–450)
PROT UR QL STRIP: NEGATIVE
RBC # BLD AUTO: 4.42 X10E6/UL (ref 3.77–5.28)
RBC #/AREA URNS HPF: NORMAL /HPF (ref 0–2)
RH BLD: ABNORMAL
RPR SER QL: NON REACTIVE
RUBV IGG SERPL IA-ACNC: 2.7 INDEX
SP GR UR STRIP: 1.02 (ref 1–1.03)
UROBILINOGEN UR STRIP-MCNC: 0.2 MG/DL (ref 0.2–1)
WBC # BLD AUTO: 11.2 X10E3/UL (ref 3.4–10.8)
WBC #/AREA URNS HPF: NORMAL /HPF (ref 0–5)

## 2024-11-29 LAB
AMPHETAMINES UR QL SCN: NEGATIVE NG/ML
BACTERIA UR CULT: NORMAL
BACTERIA UR CULT: NORMAL
BARBITURATES UR QL SCN: NEGATIVE NG/ML
BENZODIAZ UR QL SCN: NEGATIVE NG/ML
BZE UR QL SCN: NEGATIVE NG/ML
CANNABINOIDS UR QL SCN: NEGATIVE NG/ML
CREAT UR-MCNC: 117.2 MG/DL (ref 20–300)
LABORATORY COMMENT REPORT: NORMAL
METHADONE UR QL SCN: NEGATIVE NG/ML
OPIATES UR QL SCN: NEGATIVE NG/ML
OXYCODONE+OXYMORPHONE UR QL SCN: NEGATIVE NG/ML
PCP UR QL: NEGATIVE NG/ML
PH UR: 5.2 [PH] (ref 4.5–8.9)
PROPOXYPH UR QL SCN: NEGATIVE NG/ML

## 2024-11-30 LAB
C TRACH RRNA SPEC QL NAA+PROBE: NEGATIVE
N GONORRHOEA RRNA SPEC QL NAA+PROBE: NEGATIVE

## 2024-12-27 ENCOUNTER — ROUTINE PRENATAL (OUTPATIENT)
Dept: OBSTETRICS AND GYNECOLOGY | Facility: CLINIC | Age: 29
End: 2024-12-27
Payer: COMMERCIAL

## 2024-12-27 VITALS — BODY MASS INDEX: 27.95 KG/M2 | WEIGHT: 152.8 LBS

## 2024-12-27 DIAGNOSIS — Z34.90 PRENATAL CARE, ANTEPARTUM: Primary | ICD-10-CM

## 2024-12-27 DIAGNOSIS — Z34.90 PREGNANCY, UNSPECIFIED GESTATIONAL AGE: ICD-10-CM

## 2024-12-27 DIAGNOSIS — O21.9 NAUSEA AND VOMITING IN PREGNANCY: ICD-10-CM

## 2024-12-27 DIAGNOSIS — R87.612 LGSIL ON PAP SMEAR OF CERVIX: ICD-10-CM

## 2024-12-27 LAB
GLUCOSE UR STRIP-MCNC: NEGATIVE MG/DL
PROT UR STRIP-MCNC: NEGATIVE MG/DL

## 2024-12-27 RX ORDER — PROMETHAZINE HYDROCHLORIDE 25 MG/1
25 TABLET ORAL EVERY 6 HOURS PRN
Qty: 25 TABLET | Refills: 4 | Status: SHIPPED | OUTPATIENT
Start: 2024-12-27

## 2024-12-27 NOTE — PROGRESS NOTES
OB FOLLOW UP  CC- Here for care of pregnancy        Lolly Hummel is a 29 y.o.  11w4d patient being seen today for her obstetrical follow up visit. Patient reports no complaints.     Her prenatal care is complicated by (and status) :   Patient Active Problem List   Diagnosis    LGSIL on Pap smear of cervix    Mild dysplasia of cervix (JOHAN I)    History of ovarian cyst    Pregnancy    Nausea and vomiting in pregnancy       Genetic testing?: desires with gender.  NOB labs reviewed  Flu Status: Declines  Ultrasound Today: No    ROS -   Patient Denies: leaking of fluid, vaginal bleeding, dysuria, excessive vomiting, and more than 6 contractions per hour  All other systems reviewed and are negative.     The additional following portions of the patient's history were reviewed and updated as appropriate: allergies, current medications, past family history, past medical history, past social history, past surgical history, and problem list.    I have reviewed and agree with the HPI, ROS, and historical information as entered above.   Marlon Young MD          Wt 69.3 kg (152 lb 12.8 oz)   LMP 2024 (Exact Date)   BMI 27.95 kg/m²         EXAM:     Prenatal Vitals  Weight: 69.3 kg (152 lb 12.8 oz)   Fetal Heart Rate: 165          Urine Glucose Read-only: Negative  Urine Protein Read-only: Negative       Assessment and Plan    Problem List Items Addressed This Visit          Gynecologic and Obstetric Problems    Nausea and vomiting in pregnancy    Overview     2024 Rx for Bonjesta and Phenergan sent to pharmacy.            Other    LGSIL on Pap smear of cervix    Overview     2022 Pap smear with LGSIL; (BB)    2023 colposcopy with biopsy at 7 and ECC; Mild dysplasia. ECC Negative.    Pap smear 2023 was negative.  HPV high risk Pool negative.  Pap smear 2024 was negative.  HPV high risk Pool negative.  Repeat Pap smear and HPV screen in 1 year.         Pregnancy     Overview     29-year-old G1, P0.    Dates by 7-week 2-day ultrasound.  FOB Alex          Other Visit Diagnoses       Prenatal care, antepartum    -  Primary    Relevant Orders    POC Urinalysis Dipstick (Completed)    [id number] PLUS Core+SCA+ESS - Blood,            Pregnancy at 11w4d;   Desires cell free DNA screening today.  Nausea vomiting pregnancy.  OTC vitamin B6 not working.  Rx Phenergan and Bonjesta sent to pharmacy.  Labs reviewed from New OB Visit.  Counseled on genetic testing, carrier status and option for NT screen  Activity and Exercise discussed.  Patient is on Prenatal vitamins  Return in about 4 weeks (around 1/24/2025) for Next scheduled follow up.    Marlon Young MD  12/27/2024

## 2024-12-30 ENCOUNTER — TELEPHONE (OUTPATIENT)
Dept: OBSTETRICS AND GYNECOLOGY | Facility: CLINIC | Age: 29
End: 2024-12-30
Payer: COMMERCIAL

## 2024-12-30 NOTE — TELEPHONE ENCOUNTER
(Key: [id number]) Drug  Bonjesta 20-20MG er tablets Form  [employer] Electronic Prior Authorization Form. Pending   Update: Outcome  Approved today by [employer] 2017 [facility]  Request Reference Number: [id number]. BONJESTA TAB 20-20MG is approved through 12/30/2025. Your patient may now fill this prescription and it will be covered.  Authorization Expiration Date: 12/30/2025. Pharmacy notified. Copay is $60 and they will have to order it.

## 2025-01-22 ENCOUNTER — ROUTINE PRENATAL (OUTPATIENT)
Dept: OBSTETRICS AND GYNECOLOGY | Facility: CLINIC | Age: 30
End: 2025-01-22
Payer: COMMERCIAL

## 2025-01-22 VITALS — SYSTOLIC BLOOD PRESSURE: 118 MMHG | BODY MASS INDEX: 29.04 KG/M2 | DIASTOLIC BLOOD PRESSURE: 76 MMHG | WEIGHT: 158.8 LBS

## 2025-01-22 DIAGNOSIS — O21.9 NAUSEA AND VOMITING IN PREGNANCY: ICD-10-CM

## 2025-01-22 DIAGNOSIS — Z3A.15 15 WEEKS GESTATION OF PREGNANCY: Primary | ICD-10-CM

## 2025-01-22 DIAGNOSIS — R87.612 LGSIL ON PAP SMEAR OF CERVIX: ICD-10-CM

## 2025-01-22 LAB
GLUCOSE UR STRIP-MCNC: NEGATIVE MG/DL
PROT UR STRIP-MCNC: NEGATIVE MG/DL

## 2025-01-22 RX ORDER — ONDANSETRON 4 MG/1
4 TABLET, ORALLY DISINTEGRATING ORAL EVERY 8 HOURS PRN
Qty: 15 TABLET | Refills: 5 | Status: SHIPPED | OUTPATIENT
Start: 2025-01-22

## 2025-01-22 NOTE — PROGRESS NOTES
OB FOLLOW UP  CC- Here for care of pregnancy        Lolly Hummel is a 29 y.o.  15w2d patient being seen today for her obstetrical follow up visit. Patient reports left mid abdominal pain since the beginning of pregnancy.  Pain comes and goes throughout the day, describes as a soreness to pulling/tugging feeling. Does not radiate. Continues to have moderate nausea daily, denies daily vomiting.  Is taking phenergan that is helping some.      Her prenatal care is complicated by (and status) : see below.  Patient Active Problem List   Diagnosis    LGSIL on Pap smear of cervix    Mild dysplasia of cervix (JOHAN I)    History of ovarian cyst    Pregnancy    Nausea and vomiting in pregnancy       Flu Status: Declines  Ultrasound Today: No    AFP: desires    ROS -   Patient Denies: leaking of fluid, vaginal bleeding, dysuria, excessive vomiting, and more than 6 contractions per hour  All other systems reviewed and are negative.       The additional following portions of the patient's history were reviewed and updated as appropriate: allergies, current medications, past family history, past medical history, past social history, past surgical history, and problem list.      I have reviewed and agree with the HPI, ROS, and historical information as entered above.   Marlon Young MD          EXAM:     Prenatal Vitals  BP: 118/76  Weight: 72 kg (158 lb 12.8 oz)   Fetal Heart Rate: 155         Urine Glucose Read-only: Negative  Urine Protein Read-only: Negative           Assessment and Plan    Problem List Items Addressed This Visit          Gynecologic and Obstetric Problems    Nausea and vomiting in pregnancy    Overview     2024 Rx for Bonjesta and Phenergan sent to pharmacy.            Other    LGSIL on Pap smear of cervix    Overview     2022 Pap smear with LGSIL; (BB)    2023 colposcopy with biopsy at 7 and ECC; Mild dysplasia. ECC Negative.    Pap smear 2023 was negative.  HPV high  risk Pool negative.  Pap smear 1/16/2024 was negative.  HPV high risk Pool negative.  Repeat Pap smear and HPV screen in 1 year.         Pregnancy - Primary    Overview     29-year-old G1, P0.    Dates by 7-week 2-day ultrasound.  FOB Alex  Cell free DNA  12/27; low risk; male  AFP screen at 1/22/2025         Relevant Orders    POC Urinalysis Dipstick (Completed)    Alpha Fetoprotein, Maternal    US Ob 14 + Weeks Single or First Gestation       Pregnancy at 15w2d; AFP screen today.  Nausea vomiting persist.  Still taking Phenergan.  Has not tried Bonjesta yet.  Rx for Zofran for breakthrough nausea.  Normal skin ultrasound next visit.  Fetal status reassuring.   Counseled on MSAFP alone in relation to OTD and placental issues.    Anatomy scan next visit.   Activity and Exercise discussed.  Patient is on Prenatal vitamins  Return in about 5 weeks (around 2/26/2025) for Anomaly scan ultrasound.    Marlon Young MD  01/22/2025

## 2025-01-24 LAB
AFP INTERP SERPL-IMP: NORMAL
AFP INTERP SERPL-IMP: NORMAL
AFP MOM SERPL: 0.98
AFP SERPL-MCNC: 28.6 NG/ML
AGE AT DELIVERY: 28.9 YR
GA METHOD: NORMAL
GA: 15.3 WEEKS
IDDM PATIENT QL: NO
LABORATORY COMMENT REPORT: NORMAL
MULTIPLE PREGNANCY: NO
NEURAL TUBE DEFECT RISK FETUS: NORMAL %
RESULT: NORMAL

## 2025-03-05 ENCOUNTER — TELEPHONE (OUTPATIENT)
Dept: OBSTETRICS AND GYNECOLOGY | Facility: CLINIC | Age: 30
End: 2025-03-05
Payer: COMMERCIAL

## 2025-03-05 NOTE — TELEPHONE ENCOUNTER
Hub staff attempted to follow warm transfer process and was unsuccessful     Caller: Lolly Hummel    Relationship to patient: Self    Best call back number: [phone]     Patient is needing: PATIENT IS NEEDING TO RESCHEDULE HER APPOINTMENT SHE HAD TO CANCEL LAST WEEK FOR HER 20 WEEK ANATOMY SCAN AND OB FOLLOW UP WITH DR. SILVA. PATIENT STATES SHE WOULD LIKE TO BE RESCHEDULED FOR MARCH 10TH, 12TH, OR 13TH AS EARLY IN THE MORNING OR AS LATE IN THE AFTERNOON AS POSSIBLE.

## 2025-03-10 ENCOUNTER — ROUTINE PRENATAL (OUTPATIENT)
Dept: OBSTETRICS AND GYNECOLOGY | Facility: CLINIC | Age: 30
End: 2025-03-10
Payer: COMMERCIAL

## 2025-03-10 VITALS — SYSTOLIC BLOOD PRESSURE: 122 MMHG | DIASTOLIC BLOOD PRESSURE: 62 MMHG | BODY MASS INDEX: 31.46 KG/M2 | WEIGHT: 172 LBS

## 2025-03-10 DIAGNOSIS — R87.612 LGSIL ON PAP SMEAR OF CERVIX: ICD-10-CM

## 2025-03-10 DIAGNOSIS — O21.9 NAUSEA AND VOMITING IN PREGNANCY: ICD-10-CM

## 2025-03-10 DIAGNOSIS — Z3A.22 22 WEEKS GESTATION OF PREGNANCY: Primary | ICD-10-CM

## 2025-03-10 LAB
GLUCOSE UR STRIP-MCNC: NEGATIVE MG/DL
PROT UR STRIP-MCNC: NEGATIVE MG/DL

## 2025-03-10 NOTE — PROGRESS NOTES
OB FOLLOW UP  CC- Here for care of pregnancy        Lolly Hummel is a 29 y.o.  22w0d patient being seen today for her obstetrical follow up visit. Patient reports breast leaking clear fluid that started about 1 week.  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      Her prenatal care is complicated by (and status) : see below.  Patient Active Problem List   Diagnosis    LGSIL on Pap smear of cervix    Mild dysplasia of cervix (JOHAN I)    History of ovarian cyst    Pregnancy    Nausea and vomiting in pregnancy       Flu Status: Declines  Ultrasound Today: Yes  AFP was already completed and was normal.    ROS -     Patient Denies: leaking of fluid, vaginal bleeding, dysuria, excessive vomiting, and more than 6 contractions per hour  Fetal Movement : Yes  Other than what is documented in the HPI, all other systems reviewed and are negative.       The additional following portions of the patient's history were reviewed and updated as appropriate: allergies, current medications, past family history, past medical history, past social history, past surgical history, and problem list.      I have reviewed and agree with the HPI, ROS, and historical information as entered above.   Marlon Young MD      /62   Wt 78 kg (172 lb)   LMP 2024 (Exact Date)   BMI 31.46 kg/m²       EXAM:     Prenatal Vitals  BP: 122/62  Weight: 78 kg (172 lb)   Fetal Heart Rate: 141 US          Urine Glucose Read-only: Negative  Urine Protein Read-only: Negative       Assessment and Plan    Problem List Items Addressed This Visit          Gynecologic and Obstetric Problems    Nausea and vomiting in pregnancy    Overview   2024 Rx for Bonjesta and Phenergan sent to pharmacy.            Other    LGSIL on Pap smear of cervix    Overview   2022 Pap smear with LGSIL; (BB)    2023 colposcopy with biopsy at 7 and ECC;  Mild dysplasia. ECC Negative.    Pap smear 7/14/2023 was negative.  HPV high risk Pool negative.  Pap smear 1/16/2024 was negative.  HPV high risk Pool negative.  Repeat Pap smear and HPV screen in 1 year.         Pregnancy - Primary    Overview   29-year-old G1, P0.    Dates by 7-week 2-day ultrasound.  FOB Alex  Cell free DNA  12/27; low risk; male  aFP screen was negative.1/22/2025         Relevant Orders    POC Urinalysis Dipstick (Completed)    US Ob Follow Up Transabdominal Approach       Pregnancy at 22w0d  Anatomy scan today is incomplete, follow up in 4 weeks for additional views. Anatomy that was visualized was within normal limits.  Fetal status reassuring.  Needs follow-up views of the heart.  Nausea and vomiting improved on Bonjesta.  Activity and Exercise discussed.  Patient is on Prenatal vitamins  Return in about 4 weeks (around 4/7/2025) for Follow-up ultrasound.      Marlon Young MD  03/10/2025

## 2025-04-09 ENCOUNTER — ROUTINE PRENATAL (OUTPATIENT)
Dept: OBSTETRICS AND GYNECOLOGY | Facility: CLINIC | Age: 30
End: 2025-04-09
Payer: COMMERCIAL

## 2025-04-09 VITALS — DIASTOLIC BLOOD PRESSURE: 60 MMHG | BODY MASS INDEX: 31.93 KG/M2 | WEIGHT: 174.6 LBS | SYSTOLIC BLOOD PRESSURE: 122 MMHG

## 2025-04-09 DIAGNOSIS — O26.899 RH NEGATIVE STATE IN ANTEPARTUM PERIOD: ICD-10-CM

## 2025-04-09 DIAGNOSIS — Z3A.26 26 WEEKS GESTATION OF PREGNANCY: ICD-10-CM

## 2025-04-09 DIAGNOSIS — O21.9 NAUSEA AND VOMITING IN PREGNANCY: ICD-10-CM

## 2025-04-09 DIAGNOSIS — N89.8 VAGINAL DISCHARGE: ICD-10-CM

## 2025-04-09 DIAGNOSIS — Z67.91 RH NEGATIVE STATE IN ANTEPARTUM PERIOD: ICD-10-CM

## 2025-04-09 DIAGNOSIS — Z34.90 PREGNANCY, UNSPECIFIED GESTATIONAL AGE: Primary | ICD-10-CM

## 2025-04-09 DIAGNOSIS — R87.612 LGSIL ON PAP SMEAR OF CERVIX: ICD-10-CM

## 2025-04-09 PROBLEM — N87.0 MILD DYSPLASIA OF CERVIX (CIN I): Status: RESOLVED | Noted: 2023-01-17 | Resolved: 2025-04-09

## 2025-04-09 LAB
GLUCOSE UR STRIP-MCNC: NEGATIVE MG/DL
PROT UR STRIP-MCNC: NEGATIVE MG/DL
WET PREP GENITAL: NORMAL

## 2025-04-09 NOTE — PROGRESS NOTES
"    OB FOLLOW UP  CC- Here for care of pregnancy        Lolly Hummel is a 29 y.o.  26w2d patient being seen today for her obstetrical follow up visit. Patient reports \"rawness feeling in vagina\" for about 2 weeks, reports it most noticeable with intercourse and wiping with toilet paper. Denies discharge, odor, itching, dysuria.  Do changes in soaps, detergents, toilet paper.      Her prenatal care is complicated by (and status) : see below.  Patient Active Problem List   Diagnosis    LGSIL on Pap smear of cervix    History of ovarian cyst    Pregnancy    Nausea and vomiting in pregnancy    Rh negative state in antepartum period       Flu Status: Already given in current flu season  Ultrasound Today: No  Reviewed 1 hr glucose testing and TDAP next visit.    ROS -   Patient Denies: leaking of fluid, vaginal bleeding, dysuria, excessive vomiting, and more than 6 contractions per hour  Fetal Movement : normal  Other than what is documented in the HPI, all other systems reviewed and are negative.       The additional following portions of the patient's history were reviewed and updated as appropriate: allergies, current medications, past family history, past medical history, past social history, past surgical history, and problem list.      I have reviewed and agree with the HPI, ROS, and historical information as entered above.   Marlon Young MD      /60   Wt 79.2 kg (174 lb 9.6 oz)   LMP 2024 (Exact Date)   BMI 31.93 kg/m²       EXAM:     Prenatal Vitals  BP: 122/60  Weight: 79.2 kg (174 lb 9.6 oz)   Fetal Heart Rate: US               Urine Glucose Read-only: Negative  Urine Protein Read-only: Negative       Assessment and Plan    Problem List Items Addressed This Visit          Gynecologic and Obstetric Problems    Nausea and vomiting in pregnancy    Overview   2024 Rx for Bonjesta and Phenergan sent to pharmacy.            Other    LGSIL on Pap smear of cervix    Overview "   12/9/2022 Pap smear with LGSIL; (BB)    1/13/2023 colposcopy with biopsy at 7 and ECC; Mild dysplasia. ECC Negative.    Pap smear 7/14/2023 was negative.  HPV high risk Pool negative.  Pap smear 1/16/2024 was negative.  HPV high risk Pool negative.  Repeat Pap smear and HPV screen in 1 year.         Pregnancy - Primary    Overview   29-year-old G1, P0.    Dates by 7-week 2-day ultrasound.  Blood type O; weak D  FOB Alex  Cell free DNA  12/27; low risk; male  aFP screen was negative.1/22/2025         Relevant Orders    POC Urinalysis Dipstick (Completed)    US Ob Follow Up Transabdominal Approach    Rh negative state in antepartum period    Overview   Blood type O.  Weak D  Will need RhoGAM at 28 weeks and postpartum.    Consider genotyping for weak RhD type has been recommended by a [employer] Transfusion Medicine Resource Committee Work Group [[id number]]. They recommended that, if type 1, 2, or 3, the patient can be considered RhD-positive.    4/9/2025 Rh with D analysis/genotyping.  [id number].          Other Visit Diagnoses         Vaginal discharge        Relevant Orders    POC Wet Prep            Pregnancy at 26w2d  Fetal status reassuring.  Weak DU blood type.  Sent for weak Rh G analysis/genotyping.  Complaint of vaginal discharge and itching.  Wet mount today was negative..  NuSwab sent.  anatomy scan incomplete. and return for follow-up views next visit.  1 hour gtt, CBC, Antibody screen, TDAP, and RPR next visit. Instructions given  Discussed/encouraged TDAP vaccination after 28 weeks  Will likely need RhoGAM if certain allotypes of weak D confirmed.  Activity and Exercise discussed.  Return in about 3 weeks (around 4/30/2025) for One hour Glucose Screen, Follow-up ultrasound.      Marlon Young MD  04/09/2025

## 2025-04-11 LAB
C ALBICANS DNA VAG QL NAA+PROBE: NEGATIVE
C GLABRATA DNA VAG QL NAA+PROBE: NEGATIVE
C KRUSEI DNA VAG QL NAA+PROBE: NEGATIVE
C LUSITANIAE DNA VAG QL NAA+PROBE: NEGATIVE
CANDIDA DNA VAG QL NAA+PROBE: NEGATIVE

## 2025-04-30 ENCOUNTER — ROUTINE PRENATAL (OUTPATIENT)
Dept: OBSTETRICS AND GYNECOLOGY | Facility: CLINIC | Age: 30
End: 2025-04-30
Payer: COMMERCIAL

## 2025-04-30 VITALS — DIASTOLIC BLOOD PRESSURE: 62 MMHG | BODY MASS INDEX: 33.18 KG/M2 | SYSTOLIC BLOOD PRESSURE: 118 MMHG | WEIGHT: 181.4 LBS

## 2025-04-30 DIAGNOSIS — O21.9 NAUSEA AND VOMITING IN PREGNANCY: ICD-10-CM

## 2025-04-30 DIAGNOSIS — O26.899 RH NEGATIVE STATE IN ANTEPARTUM PERIOD: ICD-10-CM

## 2025-04-30 DIAGNOSIS — Z3A.29 29 WEEKS GESTATION OF PREGNANCY: Primary | ICD-10-CM

## 2025-04-30 DIAGNOSIS — Z67.91 RH NEGATIVE STATE IN ANTEPARTUM PERIOD: ICD-10-CM

## 2025-04-30 LAB
GLUCOSE UR STRIP-MCNC: NEGATIVE MG/DL
PROT UR STRIP-MCNC: NEGATIVE MG/DL

## 2025-04-30 NOTE — PROGRESS NOTES
Rhogam  NOT indicated and was not administered per MD Young.  Results of RH Type 2 D, O positive. Results of genotyping scanned to pt chart today.

## 2025-04-30 NOTE — PROGRESS NOTES
OB FOLLOW UP  CC- Here for care of pregnancy        Lolly Hummel is a 29 y.o.  29w2d patient being seen today for her obstetrical follow up. Patient reports no complaints.     Patient undergoing Glucola testing today. She is due for her testing at 10:20am.       MBT: O weak D  Rhogam:  Weakl D positive.  RHD genotyping today.  28 week packet: reviewed with patient , counseled on fetal movement , pediatrician list reviewed, breast pump discussed, and childbirth classes reviewed  TDAP: given today  Flu Status: Declines  Ultrasound Today: No  Does patient need tubal consent or  consent signed? no    Her prenatal care is complicated by (and status) : see below.  Patient Active Problem List   Diagnosis    LGSIL on Pap smear of cervix    History of ovarian cyst    Pregnancy    Nausea and vomiting in pregnancy    Rh negative state in antepartum period         ROS -   Patient Denies: Loss of Fluid, Vaginal Spotting, Vision Changes, Headaches, Nausea , Vomiting , Contractions, Epigastric pain, and skin itching  Fetal Movement : normal  Other than what is documented in the HPI, all other systems reviewed and are negative.     The additional following portions of the patient's history were reviewed and updated as appropriate: allergies, current medications, past family history, past medical history, past social history, past surgical history, and problem list.    I have reviewed and agree with the HPI, ROS, and historical information as entered above.   Marlon Young MD      /62   Wt 82.3 kg (181 lb 6.4 oz)   LMP 2024 (Exact Date)   BMI 33.18 kg/m²         EXAM:     Prenatal Vitals  BP: 118/62  Weight: 82.3 kg (181 lb 6.4 oz)   Fetal Heart Rate:                Urine Glucose Read-only: Negative  Urine Protein Read-only: Negative         Assessment and Plan    Problem List Items Addressed This Visit          Gynecologic and Obstetric Problems    Nausea and vomiting in pregnancy     Overview   2024 Rx for Bonjesta and Phenergan sent to pharmacy.            Other    Pregnancy - Primary    Overview   29-year-old G1, P0.    Dates by 7-week 2-day ultrasound.  Blood type O; weak D  FOB Alex  Cell free DNA  ; low risk; male  aFP screen was negative.2025         Relevant Orders    POC Urinalysis Dipstick (Completed)    CBC (No Diff)    Gestational Screen 1 Hr ([facility])    Antibody Screen    RPR, Rfx Qn RPR / Confirm TP    [facility] [place]    Rh negative state in antepartum period    Overview   Blood type O.  Weak D  Will need RhoGAM at 28 weeks and postpartum.    Consider genotyping for weak RhD type has been recommended by a [employer] Transfusion Medicine Resource Committee Work Group [[id number]]. They recommended that, if type 1, 2, or 3, the patient can be considered RhD-positive.    2025 Rh with D analysis/genotyping.  [id number].            Pregnancy at 29w2d; 1 hour glucose screen today.  Follow-up views of the heart and still incomplete today.  Will schedule follow-up at [facility].  Normal growth on ultrasound with estimated weight of 55%.  Weak D;  Rh genotyping consistent with type II D; rhogam not needed.   1 hr Glucola, CBC, RPR. Antibody screen and TDAP today  Fetal movement/PTL or Labor precautions  Patient is on Prenatal vitamins  Activity and Exercise discussed.  Return in about 2 weeks (around 2025) for Next scheduled follow up.        Marlon Young MD  2025

## 2025-05-01 LAB
BLD GP AB SCN SERPL QL: NEGATIVE
ERYTHROCYTE [DISTWIDTH] IN BLOOD BY AUTOMATED COUNT: 13.4 % (ref 12.3–15.4)
GLUCOSE 1H P 50 G GLC PO SERPL-MCNC: 116 MG/DL (ref 65–139)
HCT VFR BLD AUTO: 33.5 % (ref 34–46.6)
HGB BLD-MCNC: 11.2 G/DL (ref 12–15.9)
MCH RBC QN AUTO: 29.2 PG (ref 26.6–33)
MCHC RBC AUTO-ENTMCNC: 33.4 G/DL (ref 31.5–35.7)
MCV RBC AUTO: 87.2 FL (ref 79–97)
PLATELET # BLD AUTO: 233 10*3/MM3 (ref 140–450)
RBC # BLD AUTO: 3.84 10*6/MM3 (ref 3.77–5.28)
RPR SER QL: NON REACTIVE
WBC # BLD AUTO: 10.32 10*3/MM3 (ref 3.4–10.8)

## 2025-05-16 ENCOUNTER — OFFICE VISIT (OUTPATIENT)
Dept: OBSTETRICS AND GYNECOLOGY | Facility: HOSPITAL | Age: 30
End: 2025-05-16
Payer: COMMERCIAL

## 2025-05-16 ENCOUNTER — HOSPITAL ENCOUNTER (OUTPATIENT)
Dept: WOMENS IMAGING | Facility: HOSPITAL | Age: 30
Discharge: HOME | End: 2025-05-16
Admitting: OBSTETRICS & GYNECOLOGY
Payer: COMMERCIAL

## 2025-05-16 ENCOUNTER — ROUTINE PRENATAL (OUTPATIENT)
Dept: OBSTETRICS AND GYNECOLOGY | Facility: CLINIC | Age: 30
End: 2025-05-16
Payer: COMMERCIAL

## 2025-05-16 VITALS — DIASTOLIC BLOOD PRESSURE: 63 MMHG | SYSTOLIC BLOOD PRESSURE: 129 MMHG | BODY MASS INDEX: 33.03 KG/M2 | WEIGHT: 180.6 LBS

## 2025-05-16 VITALS — WEIGHT: 180 LBS | DIASTOLIC BLOOD PRESSURE: 62 MMHG | SYSTOLIC BLOOD PRESSURE: 112 MMHG | BODY MASS INDEX: 32.92 KG/M2

## 2025-05-16 DIAGNOSIS — Z3A.29 29 WEEKS GESTATION OF PREGNANCY: ICD-10-CM

## 2025-05-16 DIAGNOSIS — O21.9 NAUSEA AND VOMITING IN PREGNANCY: ICD-10-CM

## 2025-05-16 DIAGNOSIS — Z34.90 PREGNANCY, UNSPECIFIED GESTATIONAL AGE: Primary | ICD-10-CM

## 2025-05-16 DIAGNOSIS — O26.899 RH NEGATIVE STATE IN ANTEPARTUM PERIOD: ICD-10-CM

## 2025-05-16 DIAGNOSIS — Z67.91 RH NEGATIVE STATE IN ANTEPARTUM PERIOD: ICD-10-CM

## 2025-05-16 DIAGNOSIS — R87.612 LGSIL ON PAP SMEAR OF CERVIX: ICD-10-CM

## 2025-05-16 LAB
GLUCOSE UR STRIP-MCNC: NEGATIVE MG/DL
PROT UR STRIP-MCNC: NEGATIVE MG/DL

## 2025-05-16 PROCEDURE — 76811 OB US DETAILED SNGL FETUS: CPT

## 2025-05-16 NOTE — PROGRESS NOTES
OB FOLLOW UP  CC- Here for care of pregnancy        Lolly Hummel is a 29 y.o.  31w4d patient being seen today for her obstetrical follow up visit. Patient reports intermittent and mild BLE swelling at the end of the day.     Her prenatal care is complicated by (and status) :  see below.  Patient Active Problem List   Diagnosis    LGSIL on Pap smear of cervix    History of ovarian cyst    Pregnancy    Nausea and vomiting in pregnancy    Rh negative state in antepartum period       TDAP status:  Already given  Rhogam status: Was not indicated  28 week labs: Reviewed and Labs show anemia. She is not taking additional iron supplement.  Ultrasound Today: Yes with PDC. Report pending.   Non Stress Test: No      ROS -   Patient Denies: Loss of Fluid, Vaginal Spotting, Vision Changes, Headaches, Nausea , Vomiting , Contractions, Epigastric pain, and skin itching  Fetal Movement : normal  Other than what is documented in the HPI, all other systems reviewed and are negative.     The additional following portions of the patient's history were reviewed and updated as appropriate: allergies, current medications, and problem list.    I have reviewed and agree with the HPI, ROS, and historical information as entered above.   Marlon Young MD      /62   Wt 81.6 kg (180 lb)   LMP 2024 (Exact Date)   BMI 32.92 kg/m²         EXAM:     Prenatal Vitals  BP: 112/62  Weight: 81.6 kg (180 lb)   Fetal Heart Rate: PDC               Urine Glucose Read-only: Negative  Urine Protein Read-only: Negative           Assessment and Plan    Problem List Items Addressed This Visit          Gynecologic and Obstetric Problems    Nausea and vomiting in pregnancy    Overview   2024 Rx for Bonjesta and Phenergan sent to pharmacy.         Relevant Orders    POC Urinalysis Dipstick (Completed)       Other    LGSIL on Pap smear of cervix    Overview   2022 Pap smear with LGSIL; (BB)    2023 colposcopy with  biopsy at 7 and ECC; Mild dysplasia. ECC Negative.    Pap smear 7/14/2023 was negative.  HPV high risk Pool negative.  Pap smear 1/16/2024 was negative.  HPV high risk Pool negative.  Repeat Pap smear and HPV screen in 1 year.         Pregnancy - Primary    Overview   29-year-old G1, P0.    Dates by 7-week 2-day ultrasound.  Blood type O; weak D  FOB Alex  Cell free DNA  12/27; low risk; male  aFP screen was negative.1/22/2025         Relevant Orders    POC Urinalysis Dipstick (Completed)    Rh negative state in antepartum period    Overview   Blood type O.  Weak D  Will need RhoGAM at 28 weeks and postpartum.    Consider genotyping for weak RhD type has been recommended by a [employer] Transfusion Medicine Resource Committee Work Group [90]. They recommended that, if type 1, 2, or 3, the patient can be considered RhD-positive.    4/9/2025 Rh with D analysis/genotyping.  ( #[id number].)  Consistent with type 2 D.  Can be managed as Rh+.  RhoGAM not required.           Relevant Orders    POC Urinalysis Dipstick (Completed)       Pregnancy at 31w4d  PDC ultrasound for follow-up views of the heart today was reportedly normal.  Report is pending.  Complains of vaginal irritation with intercourse.  NUSwab was negative for yeast.  Fetal status reassuring.  28 week labs reviewed.    Activity and Exercise discussed.  Fetal movement/PTL or Labor precautions  Patient is on Prenatal vitamins  Return in about 2 weeks (around 5/30/2025).    Marlon Young MD  05/16/2025

## 2025-05-16 NOTE — PROGRESS NOTES
F/U with Dr. Young today.  NIPT neg.  Pt reports +FM.  Denies contractions, cramping, leaking of fluid, vaginal bleeding.

## 2025-05-20 NOTE — PROGRESS NOTES
[facility].    Please see ultrasound report under imaging tab of patient chart in Epic (Viewpoint report).    Mary Ann Dubois MD

## 2025-05-30 ENCOUNTER — ROUTINE PRENATAL (OUTPATIENT)
Dept: OBSTETRICS AND GYNECOLOGY | Facility: CLINIC | Age: 30
End: 2025-05-30
Payer: COMMERCIAL

## 2025-05-30 VITALS
BODY MASS INDEX: 34.5 KG/M2 | SYSTOLIC BLOOD PRESSURE: 128 MMHG | OXYGEN SATURATION: 97 % | WEIGHT: 188.6 LBS | DIASTOLIC BLOOD PRESSURE: 68 MMHG | HEART RATE: 94 BPM

## 2025-05-30 DIAGNOSIS — R00.0 TACHYCARDIA: ICD-10-CM

## 2025-05-30 DIAGNOSIS — O26.899 SHORTNESS OF BREATH DUE TO PREGNANCY: ICD-10-CM

## 2025-05-30 DIAGNOSIS — Z67.91 RH NEGATIVE STATE IN ANTEPARTUM PERIOD: ICD-10-CM

## 2025-05-30 DIAGNOSIS — O26.899 RH NEGATIVE STATE IN ANTEPARTUM PERIOD: ICD-10-CM

## 2025-05-30 DIAGNOSIS — Z34.90 PREGNANCY, UNSPECIFIED GESTATIONAL AGE: ICD-10-CM

## 2025-05-30 DIAGNOSIS — R06.02 SHORTNESS OF BREATH DUE TO PREGNANCY: ICD-10-CM

## 2025-05-30 DIAGNOSIS — Z34.03 PRENATAL CARE, FIRST PREGNANCY IN THIRD TRIMESTER: Primary | ICD-10-CM

## 2025-05-30 LAB
GLUCOSE UR STRIP-MCNC: NEGATIVE MG/DL
PROT UR STRIP-MCNC: NEGATIVE MG/DL

## 2025-05-30 RX ORDER — FERROUS SULFATE 324(65)MG
324 TABLET, DELAYED RELEASE (ENTERIC COATED) ORAL
COMMUNITY

## 2025-05-30 NOTE — PROGRESS NOTES
"      OB FOLLOW UP  CC- Here for care of pregnancy        Lolly Hummel is a 29 y.o.  33w4d patient being seen today for her obstetrical follow up visit. Patient reports trace swelling in BLE and cramping with increased activity. Patient reports feeling SOA and increased HR in the low 100s-130s. Patient has increased her water, sodium, and protein intake. Patient reports drinking \"a lot\" of water per day. Patient states that her s/s have improved, but are still present. Patient is taking iron 4 times per week when she remembers.     Her prenatal care is complicated by (and status) : see below.  Patient Active Problem List   Diagnosis    LGSIL on Pap smear of cervix    History of ovarian cyst    Pregnancy    Nausea and vomiting in pregnancy    Rh negative state in antepartum period     Ultrasound Today: No  Non Stress Test: No    ROS -   Patient Denies: Loss of Fluid, Vaginal Spotting, Vision Changes, Headaches, Nausea , Vomiting , Epigastric pain, and skin itching  Fetal Movement : normal  Other than what is documented in the HPI, all other systems reviewed and are negative.       The additional following portions of the patient's history were reviewed and updated as appropriate: allergies, current medications, and problem list.    I have reviewed and agree with the HPI, ROS, and historical information as entered above. Cathie Watkins, APRN      /68   Pulse 94   Wt 85.5 kg (188 lb 9.6 oz)   LMP 2024 (Exact Date)   SpO2 97%   BMI 34.50 kg/m²       EXAM:     Prenatal Vitals  BP: 128/68  Weight: 85.5 kg (188 lb 9.6 oz)   Fetal Heart Rate: 150               Urine Glucose Read-only: Negative  Urine Protein Read-only: Negative           Assessment and Plan    Problem List Items Addressed This Visit          Gravid and     Pregnancy    Overview   29-year-old G1, P0.    Dates by 7-week 2-day ultrasound.  Blood type O; weak D  FOB Alex  Cell free DNA  ; low risk; male  aFP screen was " negative.1/22/2025  PDC u/s 5/16 follow-up views of heart normal.         Rh negative state in antepartum period    Overview   Blood type O.  Weak D  Will need RhoGAM at 28 weeks and postpartum.    Consider genotyping for weak RhD type has been recommended by a [employer] Transfusion Medicine Resource Committee Work Group [[id number]]. They recommended that, if type 1, 2, or 3, the patient can be considered RhD-positive.    4/9/2025 Rh with D analysis/genotyping.  ( #[id number].)  Consistent with type 2 D.  Can be managed as Rh+.  RhoGAM not required.           Relevant Orders    POC Urinalysis Dipstick (Completed)     Other Visit Diagnoses         Prenatal care, first pregnancy in third trimester    -  Primary    Relevant Orders    POC Urinalysis Dipstick (Completed)    Ambulatory Referral to Cardiology for Other; Tachycardia and Heart palpitations    TSH    T4, Free    Comprehensive Metabolic Panel    CBC (No Diff)    ECG 12 Lead      Tachycardia        Relevant Orders    Ambulatory Referral to Cardiology for Other; Tachycardia and Heart palpitations    TSH    T4, Free    Comprehensive Metabolic Panel    CBC (No Diff)    ECG 12 Lead      Shortness of breath due to pregnancy        Relevant Orders    Ambulatory Referral to Cardiology for Other; Tachycardia and Heart palpitations    ECG 12 Lead            Pregnancy at 33w4d  Fetal status reassuring.   Activity and Exercise discussed.  Fetal movement/PTL or Labor precautions  Lab(s) Ordered  Patient is on Prenatal vitamins  Reviewed Pre-eclampsia signs/symptoms  GBS next visit  Shortness of breath during pregnancy discussed-Increased cardiac output and blood volume during pregnancy can cause this and is common in 60-70% of pregnant patients. If cardiac symptoms including palpitations, chest pain, or worsening shortness of breath occur will likely be referred to cardiology to rule out other causes.  Cardiology/EKG referral placed. Labs drawn.  Follow up in  two weeks TERESA Watkins, APRN  05/30/2025

## 2025-05-31 LAB
ALBUMIN SERPL-MCNC: 3.4 G/DL (ref 4–5)
ALP SERPL-CCNC: 229 IU/L (ref 44–121)
ALT SERPL-CCNC: 15 IU/L (ref 0–32)
AST SERPL-CCNC: 13 IU/L (ref 0–40)
BILIRUB SERPL-MCNC: 0.2 MG/DL (ref 0–1.2)
BUN SERPL-MCNC: 7 MG/DL (ref 6–20)
BUN/CREAT SERPL: 13 (ref 9–23)
CALCIUM SERPL-MCNC: 8.9 MG/DL (ref 8.7–10.2)
CHLORIDE SERPL-SCNC: 103 MMOL/L (ref 96–106)
CO2 SERPL-SCNC: 19 MMOL/L (ref 20–29)
CREAT SERPL-MCNC: 0.55 MG/DL (ref 0.57–1)
EGFRCR SERPLBLD CKD-EPI 2021: 128 ML/MIN/1.73
ERYTHROCYTE [DISTWIDTH] IN BLOOD BY AUTOMATED COUNT: 14 % (ref 11.7–15.4)
GLOBULIN SER CALC-MCNC: 2.4 G/DL (ref 1.5–4.5)
GLUCOSE SERPL-MCNC: 117 MG/DL (ref 70–99)
HCT VFR BLD AUTO: 36.5 % (ref 34–46.6)
HGB BLD-MCNC: 11.5 G/DL (ref 11.1–15.9)
MCH RBC QN AUTO: 27.8 PG (ref 26.6–33)
MCHC RBC AUTO-ENTMCNC: 31.5 G/DL (ref 31.5–35.7)
MCV RBC AUTO: 88 FL (ref 79–97)
PLATELET # BLD AUTO: 214 X10E3/UL (ref 150–450)
POTASSIUM SERPL-SCNC: 3.8 MMOL/L (ref 3.5–5.2)
PROT SERPL-MCNC: 5.8 G/DL (ref 6–8.5)
RBC # BLD AUTO: 4.13 X10E6/UL (ref 3.77–5.28)
SODIUM SERPL-SCNC: 137 MMOL/L (ref 134–144)
T4 FREE SERPL-MCNC: 0.61 NG/DL (ref 0.82–1.77)
TSH SERPL DL<=0.005 MIU/L-ACNC: 0.73 UIU/ML (ref 0.45–4.5)
WBC # BLD AUTO: 10.7 X10E3/UL (ref 3.4–10.8)

## 2025-06-02 ENCOUNTER — HOSPITAL ENCOUNTER (OUTPATIENT)
Dept: CARDIOLOGY | Facility: HOSPITAL | Age: 30
Discharge: HOME | End: 2025-06-02
Payer: COMMERCIAL

## 2025-06-02 DIAGNOSIS — O26.899 SHORTNESS OF BREATH DUE TO PREGNANCY: ICD-10-CM

## 2025-06-02 DIAGNOSIS — Z34.03 PRENATAL CARE, FIRST PREGNANCY IN THIRD TRIMESTER: ICD-10-CM

## 2025-06-02 DIAGNOSIS — R06.02 SHORTNESS OF BREATH DUE TO PREGNANCY: ICD-10-CM

## 2025-06-02 DIAGNOSIS — R00.0 TACHYCARDIA: ICD-10-CM

## 2025-06-02 PROCEDURE — 93005 ELECTROCARDIOGRAM TRACING: CPT

## 2025-06-03 ENCOUNTER — OFFICE VISIT (OUTPATIENT)
Dept: CARDIOLOGY | Facility: CLINIC | Age: 30
End: 2025-06-03
Payer: COMMERCIAL

## 2025-06-03 VITALS
HEART RATE: 87 BPM | DIASTOLIC BLOOD PRESSURE: 50 MMHG | WEIGHT: 191.8 LBS | OXYGEN SATURATION: 98 % | HEIGHT: 62 IN | SYSTOLIC BLOOD PRESSURE: 108 MMHG | BODY MASS INDEX: 35.3 KG/M2

## 2025-06-03 DIAGNOSIS — R00.2 PALPITATIONS: Primary | ICD-10-CM

## 2025-06-03 DIAGNOSIS — O26.53 MATERNAL HYPOTENSION SYNDROME IN THIRD TRIMESTER: ICD-10-CM

## 2025-06-03 PROBLEM — O26.50: Status: ACTIVE | Noted: 2025-06-03
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
## 2025-06-04 LAB
QT INTERVAL: 346 MS
QTC INTERVAL: 425 MS

## 2025-06-13 ENCOUNTER — ROUTINE PRENATAL (OUTPATIENT)
Dept: OBSTETRICS AND GYNECOLOGY | Facility: CLINIC | Age: 30
End: 2025-06-13
Payer: COMMERCIAL

## 2025-06-13 ENCOUNTER — LAB (OUTPATIENT)
Dept: LAB | Facility: HOSPITAL | Age: 30
End: 2025-06-13
Payer: COMMERCIAL

## 2025-06-13 VITALS — WEIGHT: 191 LBS | SYSTOLIC BLOOD PRESSURE: 98 MMHG | BODY MASS INDEX: 34.93 KG/M2 | DIASTOLIC BLOOD PRESSURE: 62 MMHG

## 2025-06-13 DIAGNOSIS — Z34.90 PREGNANCY, UNSPECIFIED GESTATIONAL AGE: Primary | ICD-10-CM

## 2025-06-13 DIAGNOSIS — Z34.90 PREGNANCY, UNSPECIFIED GESTATIONAL AGE: ICD-10-CM

## 2025-06-13 DIAGNOSIS — R00.2 PALPITATIONS: ICD-10-CM

## 2025-06-13 DIAGNOSIS — O26.50 MATERNAL HYPOTENSION SYNDROME, ANTEPARTUM: ICD-10-CM

## 2025-06-13 DIAGNOSIS — O26.899 RH NEGATIVE STATE IN ANTEPARTUM PERIOD: ICD-10-CM

## 2025-06-13 DIAGNOSIS — R87.612 LGSIL ON PAP SMEAR OF CERVIX: ICD-10-CM

## 2025-06-13 DIAGNOSIS — Z67.91 RH NEGATIVE STATE IN ANTEPARTUM PERIOD: ICD-10-CM

## 2025-06-13 DIAGNOSIS — O21.9 NAUSEA AND VOMITING IN PREGNANCY: ICD-10-CM

## 2025-06-13 LAB
GLUCOSE UR STRIP-MCNC: NEGATIVE MG/DL
PROT UR STRIP-MCNC: NEGATIVE MG/DL

## 2025-06-13 PROCEDURE — 87081 CULTURE SCREEN ONLY: CPT

## 2025-06-13 NOTE — PROGRESS NOTES
OB FOLLOW UP  CC- Here for care of pregnancy        Lolly Hummel is a 29 y.o.  35w4d patient being seen today for her obstetrical follow up visit. Patient reports mild cramping. Reports that she had one headache that she did not take tylenol for, denies vision changes. Reports intermittent nausea but denies vomiting. She would like to discuss her birth plan since baby is breech. She reports that she is waiting to hear back from cardiologist but her symptoms have persisted.     Her prenatal care is complicated by (and status) :   Patient Active Problem List   Diagnosis    LGSIL on Pap smear of cervix    History of ovarian cyst    Pregnancy    Nausea and vomiting in pregnancy    Rh negative state in antepartum period    Palpitations    Maternal hypotension syndrome    Breech presentation       GBS Status: Done Today. She is allergic to PCN.    Allergies   Allergen Reactions    Shellfish-Derived Products Shortness Of Breath     Airway narrows     Penicillins Seizure              Her Delivery Plan is: Undecided    US today: no  Non Stress Test: No.    ROS -   Patient Denies: Loss of Fluid, Vaginal Spotting, Vision Changes, Vomiting , Epigastric pain, and skin itching  Fetal Movement : normal  Other than what is documented in the HPI, all other systems reviewed and are negative.       The additional following portions of the patient's history were reviewed and updated as appropriate: allergies, current medications, past family history, past medical history, past social history, past surgical history, and problem list.    I have reviewed and agree with the HPI, ROS, and historical information as entered above.   Marlon Young MD        EXAM:     Prenatal Vitals  BP: 98/62  Weight: 86.6 kg (191 lb)   Fetal Heart Rate: 145   Fundal Height (cm): 34 cm   Dilation/Effacement/Station  Dilation: Closed  Effacement (%): 50  Station: -3      Urine Glucose Read-only: Negative  Urine Protein Read-only:  Negative           Assessment and Plan    Problem List Items Addressed This Visit          Gynecologic and Obstetric Problems    Maternal hypotension syndrome    Overview   6/2/2025 cardiology evaluation.    Maternal hypotension syndrome in third trimester  Continue aggressive hydration, compression stockings, exercise.             Nausea and vomiting in pregnancy    Overview   12/27/2024 Rx for Bonjesta and Phenergan sent to pharmacy.            Other    Breech presentation    Overview   PDC ultrasound 5/16/2025 with estimated fetal weight of 58%.  HC 91% AC 68%.  Breech presentation.  Anterior placenta.           Relevant Orders    US Ob Follow Up Transabdominal Approach    US Fetal Biophysical Profile;Without Non-Stress Testing    LGSIL on Pap smear of cervix    Overview   12/9/2022 Pap smear with LGSIL; (BB)    1/13/2023 colposcopy with biopsy at 7 and ECC; Mild dysplasia. ECC Negative.    Pap smear 7/14/2023 was negative.  HPV high risk Pool negative.  Pap smear 1/16/2024 was negative.  HPV high risk Pool negative.  Repeat Pap smear and HPV screen in 1 year.         Palpitations    Overview   Cardiology consultation6/4/2025.  Normal EKG.  72-hour Holter monitor ordered.         Pregnancy - Primary    Overview   29-year-old G1, P0.    Dates by 7-week 2-day ultrasound.  Blood type O; weak D  FOB Alex  Cell free DNA  12/27; low risk; male  aFP screen was negative.1/22/2025  PDC u/s 5/16 follow-up views of heart normal.         Relevant Orders    POC Urinalysis Dipstick (Completed)    Group B Streptococcus Culture - Swab, Vaginal/Rectum    Rh negative state in antepartum period    Overview   Blood type O.  Weak D  Will need RhoGAM at 28 weeks and postpartum.    Consider genotyping for weak RhD type has been recommended by a [employer] Transfusion Medicine Resource Committee Work Group [90]. They recommended that, if type 1, 2, or 3, the patient can be considered RhD-positive.    4/9/2025 Rh  with D analysis/genotyping.  ( #[id number].)  Consistent with type 2 D.  Can be managed as Rh+.  RhoGAM not required.              Pregnancy at 35w4d; group B strep screen done today.  Palpitations.  Cardiology consultation 6/4/2025.  Twelve-lead EKG was normal.  Completed Holter monitor.  Results pending.  Breech presentation.  PDC ultrasound 5/16/2025 EFW 43%; AC 82%; BPD 2%. Repeat ultrasound next week.; discussed options of external cephalic version.  Fetal status reassuring.   Reviewed Pre-eclampsia signs/symptoms  Delivery options reviewed with patient  Signs of labor reviewed  Kick counts reviewed  Activity and Exercise discussed.  Return in about 1 week (around 6/20/2025) for Growth ultrasound and biophysical profile.    Marlon Young MD  06/13/2025

## 2025-06-16 LAB — BACTERIA SPEC AEROBE CULT: NORMAL

## 2025-06-27 ENCOUNTER — ROUTINE PRENATAL (OUTPATIENT)
Dept: OBSTETRICS AND GYNECOLOGY | Facility: CLINIC | Age: 30
End: 2025-06-27
Payer: COMMERCIAL

## 2025-06-27 VITALS — DIASTOLIC BLOOD PRESSURE: 76 MMHG | WEIGHT: 198.4 LBS | SYSTOLIC BLOOD PRESSURE: 120 MMHG | BODY MASS INDEX: 36.29 KG/M2

## 2025-06-27 DIAGNOSIS — Z34.93 THIRD TRIMESTER PREGNANCY: Primary | ICD-10-CM

## 2025-06-27 LAB
GLUCOSE UR STRIP-MCNC: NEGATIVE MG/DL
PROT UR STRIP-MCNC: NEGATIVE MG/DL

## 2025-06-27 PROCEDURE — 0502F SUBSEQUENT PRENATAL CARE: CPT | Performed by: OBSTETRICS & GYNECOLOGY

## 2025-06-27 NOTE — PROGRESS NOTES
OB FOLLOW UP  CC- Here for care of pregnancy        Lolly Hummel is a 29 y.o.  37w4d patient being seen today for her obstetrical follow up visit. Patient reports moderate cramping and she believes she is having maybe 1 contraction a day..     Her prenatal care is complicated by (and status) : see below.  Patient Active Problem List   Diagnosis    LGSIL on Pap smear of cervix    History of ovarian cyst    Pregnancy    Nausea and vomiting in pregnancy    Rh negative state in antepartum period    Palpitations    Maternal hypotension syndrome    Breech presentation       GBS Status:   Group B Strep Culture   Date Value Ref Range Status   2025 No Group B Streptococcus Isolated at 2 days  Final         Allergies   Allergen Reactions    Shellfish-Derived Products Shortness Of Breath     Airway narrows     Penicillins Seizure        Her Delivery Plan is: csection at 39 weeks    US today: yes BPP   Non Stress Test: No.    ROS -   Patient Denies: Loss of Fluid, Vaginal Spotting, Vision Changes, Headaches, Nausea , Vomiting , Epigastric pain, and skin itching  Fetal Movement : normal  Other than what is documented in the HPI, all other systems reviewed and are negative.       The additional following portions of the patient's history were reviewed and updated as appropriate: allergies and current medications.    I have reviewed and agree with the HPI, ROS, and historical information as entered above. Pablito Morgan MD        EXAM:     Prenatal Vitals  BP: 120/76  Weight: 90 kg (198 lb 6.4 oz)   Fetal Heart Rate: 140              Urine Glucose Read-only: Negative  Urine Protein Read-only: Negative           Assessment and Plan    Problem List Items Addressed This Visit    None  Visit Diagnoses         Third trimester pregnancy    -  Primary    Relevant Orders    POC Urinalysis Dipstick (Completed)            Pregnancy at 37w4d  Fetal status reassuring.   Reviewed Pre-eclampsia signs/symptoms  Reviewed  upcoming c/s and PAT instructions with patient. Arrival time and NPO status reviewed.   Breech on US and we sent C/S for 7/8   Delivery options reviewed with patient  Signs of labor reviewed  Kick counts reviewed  Activity and Exercise discussed.  Return in about 1 week (around 7/4/2025) for Recheck.  Cardiac eval was leena Morgan MD  06/27/2025

## 2025-07-03 ENCOUNTER — PREP FOR SURGERY (OUTPATIENT)
Dept: OTHER | Facility: HOSPITAL | Age: 30
End: 2025-07-03
Payer: COMMERCIAL

## 2025-07-03 ENCOUNTER — ROUTINE PRENATAL (OUTPATIENT)
Dept: OBSTETRICS AND GYNECOLOGY | Facility: CLINIC | Age: 30
End: 2025-07-03
Payer: COMMERCIAL

## 2025-07-03 VITALS — DIASTOLIC BLOOD PRESSURE: 82 MMHG | SYSTOLIC BLOOD PRESSURE: 128 MMHG | BODY MASS INDEX: 36.4 KG/M2 | WEIGHT: 199 LBS

## 2025-07-03 DIAGNOSIS — Z67.91 RH NEGATIVE STATE IN ANTEPARTUM PERIOD: ICD-10-CM

## 2025-07-03 DIAGNOSIS — Z34.90 PREGNANCY, UNSPECIFIED GESTATIONAL AGE: Primary | ICD-10-CM

## 2025-07-03 DIAGNOSIS — Z01.818 PREOP EXAMINATION: Primary | ICD-10-CM

## 2025-07-03 DIAGNOSIS — O21.9 NAUSEA AND VOMITING IN PREGNANCY: ICD-10-CM

## 2025-07-03 DIAGNOSIS — O26.899 RH NEGATIVE STATE IN ANTEPARTUM PERIOD: ICD-10-CM

## 2025-07-03 LAB
GLUCOSE UR STRIP-MCNC: NEGATIVE MG/DL
PROT UR STRIP-MCNC: NEGATIVE MG/DL

## 2025-07-03 RX ORDER — LIDOCAINE HYDROCHLORIDE 10 MG/ML
0.5 INJECTION, SOLUTION EPIDURAL; INFILTRATION; INTRACAUDAL; PERINEURAL ONCE AS NEEDED
OUTPATIENT
Start: 2025-07-03

## 2025-07-03 RX ORDER — OXYTOCIN/0.9 % SODIUM CHLORIDE 30/500 ML
85 PLASTIC BAG, INJECTION (ML) INTRAVENOUS ONCE
OUTPATIENT
Start: 2025-07-03 | End: 2025-07-03

## 2025-07-03 RX ORDER — CARBOPROST TROMETHAMINE 250 UG/ML
250 INJECTION, SOLUTION INTRAMUSCULAR AS NEEDED
OUTPATIENT
Start: 2025-07-03

## 2025-07-03 RX ORDER — SODIUM CHLORIDE 0.9 % (FLUSH) 0.9 %
10 SYRINGE (ML) INJECTION EVERY 12 HOURS SCHEDULED
OUTPATIENT
Start: 2025-07-03

## 2025-07-03 RX ORDER — CITRIC ACID/SODIUM CITRATE 334-500MG
30 SOLUTION, ORAL ORAL ONCE
OUTPATIENT
Start: 2025-07-03 | End: 2025-07-03

## 2025-07-03 RX ORDER — KETOROLAC TROMETHAMINE 30 MG/ML
30 INJECTION, SOLUTION INTRAMUSCULAR; INTRAVENOUS ONCE
OUTPATIENT
Start: 2025-07-03 | End: 2025-07-03

## 2025-07-03 RX ORDER — SODIUM CHLORIDE, SODIUM LACTATE, POTASSIUM CHLORIDE, CALCIUM CHLORIDE 600; 310; 30; 20 MG/100ML; MG/100ML; MG/100ML; MG/100ML
125 INJECTION, SOLUTION INTRAVENOUS CONTINUOUS
OUTPATIENT
Start: 2025-07-03 | End: 2025-07-04

## 2025-07-03 RX ORDER — MISOPROSTOL 200 UG/1
800 TABLET ORAL AS NEEDED
OUTPATIENT
Start: 2025-07-03

## 2025-07-03 RX ORDER — SODIUM CHLORIDE 0.9 % (FLUSH) 0.9 %
10 SYRINGE (ML) INJECTION AS NEEDED
OUTPATIENT
Start: 2025-07-03

## 2025-07-03 RX ORDER — BUPIVACAINE HCL/0.9 % NACL/PF 0.1 %
2 PLASTIC BAG, INJECTION (ML) EPIDURAL ONCE
OUTPATIENT
Start: 2025-07-03 | End: 2025-07-03

## 2025-07-03 RX ORDER — METHYLERGONOVINE MALEATE 0.2 MG/ML
200 INJECTION INTRAVENOUS ONCE AS NEEDED
OUTPATIENT
Start: 2025-07-03

## 2025-07-03 RX ORDER — ACETAMINOPHEN 500 MG
1000 TABLET ORAL ONCE
OUTPATIENT
Start: 2025-07-03 | End: 2025-07-03

## 2025-07-03 RX ORDER — OXYTOCIN/0.9 % SODIUM CHLORIDE 30/500 ML
650 PLASTIC BAG, INJECTION (ML) INTRAVENOUS ONCE
OUTPATIENT
Start: 2025-07-03 | End: 2025-07-03

## 2025-07-03 RX ORDER — SODIUM CHLORIDE 9 MG/ML
40 INJECTION, SOLUTION INTRAVENOUS AS NEEDED
OUTPATIENT
Start: 2025-07-03

## 2025-07-03 NOTE — PROGRESS NOTES
OB FOLLOW UP  CC- Here for care of pregnancy        Lolly Hummel is a 29 y.o.  38w3d patient being seen today for her obstetrical follow up visit. Patient reports occasional contractions, swelling in legs and hands.     Her prenatal care is complicated by (and status) : see below.  Patient Active Problem List   Diagnosis    LGSIL on Pap smear of cervix    History of ovarian cyst    Pregnancy    Nausea and vomiting in pregnancy    Rh negative state in antepartum period    Palpitations    Maternal hypotension syndrome    Breech presentation    Currently pregnant       GBS Status:   Group B Strep Culture   Date Value Ref Range Status   2025 No Group B Streptococcus Isolated at 2 days  Final         Allergies   Allergen Reactions    Shellfish-Derived Products Shortness Of Breath     Airway narrows     Penicillins Seizure          Her Delivery Plan is: Primary . Scheduled    US today: no  Non Stress Test: No.    ROS -   Patient Denies: Loss of Fluid and Vaginal Spotting  Fetal Movement : normal  Other than what is documented in the HPI, all other systems reviewed and are negative.       The additional following portions of the patient's history were reviewed and updated as appropriate: allergies and current medications.    I have reviewed and agree with the HPI, ROS, and historical information as entered above. Pablito Morgan MD        EXAM:     Prenatal Vitals  BP: 128/82  Weight: 90.3 kg (199 lb)   Fetal Heart Rate: 140              Urine Glucose Read-only: Negative  Urine Protein Read-only: Negative           Assessment and Plan    Problem List Items Addressed This Visit          Gravid and     Pregnancy - Primary    Overview   29-year-old G1, P0.    Dates by 7-week 2-day ultrasound.  Blood type O; weak D  FOB Alex  Cell free DNA  ; low risk; male  aFP screen was negative.2025  PDC u/s  follow-up views of heart normal.         Relevant Orders    POC Urinalysis  Dipstick (Completed)    Nausea and vomiting in pregnancy    Overview   2024 Rx for Bonjesta and Phenergan sent to pharmacy.         Rh negative state in antepartum period    Overview   Blood type O.  Weak D  Will need RhoGAM at 28 weeks and postpartum.    Consider genotyping for weak RhD type has been recommended by a [employer] Transfusion Medicine Resource Committee Work Group [[id number]]. They recommended that, if type 1, 2, or 3, the patient can be considered RhD-positive.    2025 Rh with D analysis/genotyping.  ( #[id number].)  Consistent with type 2 D.  Can be managed as Rh+.  RhoGAM not required.           Breech presentation    Overview   PDC ultrasound 2025 with estimated fetal weight of 58%.  HC 91% AC 68%.  Breech presentation.  Anterior placenta.            US here shows breech presentation and we wneed to assesLeft ovary for poss removal   Pregnancy at 38w3d  Fetal status reassuring.   Reviewed Pre-eclampsia signs/symptoms  Discussed options of outright  vs version for breech presentation.  Pt. elects for outright c/s due to breech presentation. Plan to schedule at 39wks.  Reviewed upcoming c/s and PAT instructions with patient. Arrival time and NPO status reviewed.   Delivery options reviewed with patient  Signs of labor reviewed  Kick counts reviewed  Activity and Exercise discussed.  Return for to have C/Section Tuesday .    Pablito Morgan MD  2025